# Patient Record
Sex: FEMALE | Race: WHITE | Employment: UNEMPLOYED | URBAN - NONMETROPOLITAN AREA
[De-identification: names, ages, dates, MRNs, and addresses within clinical notes are randomized per-mention and may not be internally consistent; named-entity substitution may affect disease eponyms.]

---

## 2020-09-14 ENCOUNTER — TELEPHONE (OUTPATIENT)
Dept: FAMILY MEDICINE CLINIC | Age: 30
End: 2020-09-14

## 2020-09-14 NOTE — TELEPHONE ENCOUNTER
Patient called stating that she recently saw her ob-gyn due to pelvic pain and they did an ultrasound and checked her IUD and everything was normal. Over the weekend the patient didn't take her Metformin and stated the pelvic pain was not there but when she restarted it the pain has come back. After talking with her sister she is worried about lactic acid levels or if this could be causing problems if it was abnormal and is wondering if you can order labs for her to complete.  Bfharlan vizcaino

## 2020-09-21 RX ORDER — SERTRALINE HYDROCHLORIDE 100 MG/1
100 TABLET, FILM COATED ORAL DAILY
COMMUNITY

## 2020-09-21 RX ORDER — OMEPRAZOLE 40 MG/1
40 CAPSULE, DELAYED RELEASE ORAL DAILY
COMMUNITY

## 2020-09-21 RX ORDER — ALBUTEROL SULFATE 90 UG/1
2 AEROSOL, METERED RESPIRATORY (INHALATION)
COMMUNITY

## 2020-09-21 RX ORDER — METFORMIN HYDROCHLORIDE 500 MG/1
500 TABLET ORAL 2 TIMES DAILY WITH MEALS
COMMUNITY
End: 2020-09-22 | Stop reason: CLARIF

## 2020-09-21 RX ORDER — ADALIMUMAB 40MG/0.8ML
40 KIT SUBCUTANEOUS
COMMUNITY

## 2020-09-22 ENCOUNTER — VIRTUAL VISIT (OUTPATIENT)
Dept: FAMILY MEDICINE CLINIC | Age: 30
End: 2020-09-22
Payer: MEDICAID

## 2020-09-22 DIAGNOSIS — Z00.00 ENCOUNTER FOR PREVENTATIVE ADULT HEALTH CARE EXAMINATION: ICD-10-CM

## 2020-09-22 DIAGNOSIS — E28.2 PCOS (POLYCYSTIC OVARIAN SYNDROME): Primary | ICD-10-CM

## 2020-09-22 DIAGNOSIS — E53.8 B12 DEFICIENCY: ICD-10-CM

## 2020-09-22 DIAGNOSIS — E55.9 VITAMIN D DEFICIENCY: ICD-10-CM

## 2020-09-22 DIAGNOSIS — E28.2 PCOS (POLYCYSTIC OVARIAN SYNDROME): ICD-10-CM

## 2020-09-22 DIAGNOSIS — R10.2 PELVIC PAIN: ICD-10-CM

## 2020-09-22 PROCEDURE — 99214 OFFICE O/P EST MOD 30 MIN: CPT | Performed by: NURSE PRACTITIONER

## 2020-09-22 RX ORDER — METFORMIN HYDROCHLORIDE 500 MG/1
TABLET, EXTENDED RELEASE ORAL
Qty: 60 TAB | Refills: 1 | Status: SHIPPED | OUTPATIENT
Start: 2020-09-22

## 2020-09-22 RX ORDER — MINERAL OIL
180 ENEMA (ML) RECTAL DAILY
COMMUNITY

## 2020-09-22 NOTE — PROGRESS NOTES
Lul Cardenas presents today for   Chief Complaint   Patient presents with    Abdominal Cramping     Patient has been having a lot of abdominal cramping for about a month. She said she had an ultrasound done 2 weeks ago to rule out the IUD causing the pain. Depression Screening:  3 most recent PHQ Screens 9/22/2020   Little interest or pleasure in doing things Not at all   Feeling down, depressed, irritable, or hopeless Not at all   Total Score PHQ 2 0       Learning Assessment:  Learning Assessment 9/22/2020   PRIMARY LEARNER Patient   HIGHEST LEVEL OF EDUCATION - PRIMARY LEARNER  2 YEARS OF COLLEGE   BARRIERS PRIMARY LEARNER NONE   CO-LEARNER CAREGIVER No   PRIMARY LANGUAGE ENGLISH   LEARNER PREFERENCE PRIMARY DEMONSTRATION   ANSWERED BY patient   RELATIONSHIP SELF       Health Maintenance reviewed and discussed and ordered per Provider. Health Maintenance Due   Topic Date Due    Pneumococcal 0-64 years (1 of 1 - PPSV23) 05/12/1998    DTaP/Tdap/Td series (1 - Tdap) 05/12/2013    PAP AKA CERVICAL CYTOLOGY  05/12/2013    Flu Vaccine (1) 09/01/2020   . Coordination of Care:  1. Have you been to the ER, urgent care clinic since your last visit? Hospitalized since your last visit? ER for sinus infection about a month ago. 2. Have you seen or consulted any other health care providers outside of the [address] [place] [address] since your last visit? Include any pap smears or colon screening.  No

## 2020-09-22 NOTE — PATIENT INSTRUCTIONS
Polycystic Ovary Syndrome: Care Instructions Your Care Instructions Polycystic ovary syndrome, or PCOS, means a woman's hormones are out of balance. It can cause problems with your periods and make it hard to get pregnant. Doctors don't know for sure what causes PCOS, but it seems to run in families. It also seems to be linked to obesity and a risk for diabetes. If you have PCOS, your sisters and daughters have a higher chance of getting it too. You may have other symptoms. These include weight gain, acne, too much hair growth on the face or body, high blood pressure, and high blood sugar. Your ovaries may have cysts on them. These cysts are growths filled with fluid. Keep in mind that although you may not have regular periods, you can still get pregnant. Talk to your doctor about birth control if you do not want to get pregnant. Sometimes the hormone changes with PCOS can also make it hard for some women to get pregnant. If this is a concern, talk to your doctor about treatment for this problem. Women who have PCOS can go for months or longer with no period. Your doctor may recommend medicines that can help get your cycles back to normal. 
Follow-up care is a key part of your treatment and safety. Be sure to make and go to all appointments, and call your doctor if you are having problems. It's also a good idea to know your test results and keep a list of the medicines you take. How can you care for yourself at home? · Take your medicines exactly as prescribed. Call your doctor if you think you are having a problem with your medicine. · Eat a healthy diet. Include fruits, vegetables, beans, and whole grains in your diet each day. · If you are overweight, losing weight can help with many of the symptoms of PCOS. Talk to your doctor about safe ways to lose weight. · Get at least 30 minutes of exercise on most days of the week.  Walking is a good choice. Or you can run, swim, cycle, or play tennis or team sports. · For hair growth you don't want, try bleaching, plucking, electrolysis, or laser therapy. · Acne can be treated with over-the-counter medicines. Look for ones that have benzoyl peroxide or salicylic acid in them. When should you call for help? Call your doctor now or seek immediate medical care if: 
  · You have severe vaginal bleeding.  
  · You have new or worse belly or pelvic pain. Watch closely for changes in your health, and be sure to contact your doctor if: 
  · You do not get better as expected.  
  · You have unusual vaginal bleeding. Where can you learn more? Go to http://huseyin-kolton.info/ Enter A899 in the search box to learn more about \"Polycystic Ovary Syndrome: Care Instructions. \" Current as of: November 8, 2019               Content Version: 12.6 © 3960-7422 [employer]. Care instructions adapted under license by [employer] (which disclaims liability or warranty for this information). If you have questions about a medical condition or this instruction, always ask your healthcare professional. [address] any warranty or liability for your use of this information.

## 2020-09-22 NOTE — PROGRESS NOTES
Loly Tee is a 31 y. o.female who presents today via virtual video visit for   Chief Complaint   Patient presents with    Abdominal Cramping     Patient has been having a lot of abdominal cramping for about a month. She said she had an ultrasound done 2 weeks ago to rule out the IUD causing the pain. 73-year-old female presents today via virtual video visit for routine follow-up. Patient has medical history significant for PCOS currently on metformin. Patient states that she is developed pelvic pain she attributes that to the metformin. She states this developed roughly 4 months ago however she has been on the metformin for roughly 3 years. She states she saw OB/GYN they reassess her IUD and noted no abnormalities there. Patient denies any urinary signs or symptoms denies fever or flank pain. Does have history of Crohn's disease on Humira infusions. Denies any constipation diarrhea or abdominal pain. She rates current pelvic pain 4/10. Patient denies any vaginal discharge or pruritus.     Subjective:           Past Medical History:   Diagnosis Date    Arthritis     Depression     GERD (gastroesophageal reflux disease)     Headache     History of chicken pox      Past Surgical History:   Procedure Laterality Date    HX ADENOIDECTOMY      HX HEENT      tubes in ears     HX TONSILLECTOMY      HX WISDOM TEETH EXTRACTION       Social History     Socioeconomic History    Marital status: SINGLE     Spouse name: Not on file    Number of children: Not on file    Years of education: Not on file    Highest education level: Not on file   Tobacco Use    Smoking status: Current Every Day Smoker     Packs/day: 0.25     Years: 8.00     Pack years: 2.00    Smokeless tobacco: Never Used   Substance and Sexual Activity    Alcohol use: Yes     Comment: occasional    Drug use: Not Currently    Sexual activity: Yes     Current Outpatient Medications   Medication Sig Dispense Refill    fexofenadine (ALLEGRA) 180 mg tablet Take 180 mg by mouth daily.  metFORMIN ER (GLUCOPHAGE XR) 500 mg tablet 1 tab po bid 60 Tab 1    adalimumab (Humira) 40 mg/0.8 mL injection 40 mg by SubCUTAneous route every seven (7) days.  omeprazole (PRILOSEC) 40 mg capsule Take 40 mg by mouth daily.  albuterol (ProAir HFA) 90 mcg/actuation inhaler Take 2 Puffs by inhalation every four (4) hours as needed for Wheezing.  sertraline (ZOLOFT) 100 mg tablet Take 100 mg by mouth daily. Allergies   Allergen Reactions    Latex Unknown (comments)    Adhesive Unknown (comments)     The patient has a family history of    REVIEW OF SYSTEMS  Review of Systems   Constitutional: Negative for chills and fever. HENT: Negative for ear discharge, ear pain, hearing loss, sinus pain and sore throat. Eyes: Negative for pain. Respiratory: Negative for cough and shortness of breath. Cardiovascular: Negative for chest pain, palpitations and leg swelling. Gastrointestinal: Negative for nausea and vomiting. Genitourinary: Negative for dysuria, frequency and urgency. Musculoskeletal: Negative for falls, myalgias and neck pain. Skin: Negative for rash. Neurological: Negative for dizziness, tingling, tremors and headaches. Psychiatric/Behavioral: Negative for depression, substance abuse and suicidal ideas. The patient is not nervous/anxious and does not have insomnia. Objective: There were no vitals taken for this visit. Current Outpatient Medications   Medication Instructions    albuterol (ProAir HFA) 90 mcg/actuation inhaler 2 Puffs, Inhalation, EVERY 4 HOURS AS NEEDED    fexofenadine (ALLEGRA) 180 mg, Oral, DAILY    Humira 40 mg, SubCUTAneous, EVERY 7 DAYS    metFORMIN ER (GLUCOPHAGE XR) 500 mg tablet 1 tab po bid    omeprazole (PRILOSEC) 40 mg, Oral, DAILY    sertraline (ZOLOFT) 100 mg, Oral, DAILY        PHYSICAL EXAM  Physical Exam  Constitutional:       Appearance: She is obese.    HENT: Head: Normocephalic and atraumatic. Eyes:      General: No scleral icterus. Right eye: No discharge. Left eye: No discharge. Pulmonary:      Effort: Pulmonary effort is normal. No respiratory distress. Musculoskeletal:         General: No swelling. Right lower leg: No edema. Left lower leg: No edema. Skin:     Coloration: Skin is not pale. Findings: No erythema or rash. Neurological:      General: No focal deficit present. Mental Status: She is alert and oriented to person, place, and time. Psychiatric:         Mood and Affect: Mood normal.         Behavior: Behavior normal.         Thought Content: Thought content normal.         Judgment: Judgment normal.         Assessment/Plan:     Diagnoses and all orders for this visit:    1. PCOS (polycystic ovarian syndrome)  -     metFORMIN ER (GLUCOPHAGE XR) 500 mg tablet; 1 tab po bid  -     HEMOGLOBIN A1C WITH EAG; Future  -     METABOLIC PANEL, COMPREHENSIVE; Future  -Labs today any changes to current treatment contingent upon those findings    2. Encounter for preventative adult health care examination  -     CBC W/O DIFF; Future  -     LIPID PANEL; Future  -     METABOLIC PANEL, COMPREHENSIVE; Future  -     TSH 3RD GENERATION; Future  -Labs today any changes to current treatment contingent upon those findings    3. Vitamin D deficiency  VITAMIN D, 25 HYDROXY; Future  -Labs today any changes to current treatment contingent upon those findings      4. B12 deficiency  -     VITAMIN B12; Future  -Labs today any changes to current treatment contingent upon those findings    5. Pelvic pain  Highly unlikely that the metformin is causing pelvic pain specifically however I am changing her over to the extended release version she will follow-up in 1 month or sooner as needed.       Follow-up and Dispositions    · Return in about 1 month (around 10/22/2020) for reassessment, lenka Samuel, who was evaluated through a synchronous (real-time) audio-video encounter, and/or her healthcare decision maker, is aware that it is a billable service, with coverage as determined by her insurance carrier. She provided verbal consent to proceed: Yes, and patient identification was verified. It was conducted pursuant to the emergency declaration under the [facility], [address] [place] [address] authority and the [employer] General Act. A caregiver was present when appropriate. Ability to conduct physical exam was limited. I was in the office. The patient was at home. Disclaimer:    I have discussed the diagnosis with the patient and the intended plan as seen above. The patient understands our medical plan. The risks, benefits and significant side effects of all medications have been reviewed. Anticipated time course and progression of condition reviewed. All questions have been addressed. She received an after visit summary, with information reviewed, and questions answered. Where appropriate, she is instructed to call the clinic if she has not been notified either by phone or through [address] with the results of her tests or with an appointment plan for any referrals within 1 week(s). The patient  is to call if her condition worsens or fails to improve or if significant side effects are experienced.        Iglesia Mahan NP

## 2020-10-22 ENCOUNTER — VIRTUAL VISIT (OUTPATIENT)
Dept: FAMILY MEDICINE CLINIC | Age: 30
End: 2020-10-22
Payer: MEDICAID

## 2020-10-22 DIAGNOSIS — E28.2 PCOS (POLYCYSTIC OVARIAN SYNDROME): Primary | ICD-10-CM

## 2020-10-22 PROCEDURE — 99213 OFFICE O/P EST LOW 20 MIN: CPT | Performed by: NURSE PRACTITIONER

## 2020-10-22 NOTE — PROGRESS NOTES
Honey Prieto presents today for   Chief Complaint   Patient presents with    Follow-up    Diabetes         Depression Screening:  3 most recent PHQ Screens 10/22/2020   Little interest or pleasure in doing things Not at all   Feeling down, depressed, irritable, or hopeless Not at all   Total Score PHQ 2 0       Learning Assessment:  Learning Assessment 9/22/2020   PRIMARY LEARNER Patient   HIGHEST LEVEL OF EDUCATION - PRIMARY LEARNER  2 YEARS OF COLLEGE   BARRIERS PRIMARY LEARNER NONE   CO-LEARNER CAREGIVER No   PRIMARY LANGUAGE ENGLISH   LEARNER PREFERENCE PRIMARY DEMONSTRATION   ANSWERED BY patient   RELATIONSHIP SELF         Health Maintenance reviewed and discussed and ordered per Provider. Health Maintenance Due   Topic Date Due    Pneumococcal 0-64 years (1 of 1 - PPSV23) 05/12/1998    PAP AKA CERVICAL CYTOLOGY  05/12/2013    Flu Vaccine (1) 09/01/2020   . Coordination of Care:  1. Have you been to the ER, urgent care clinic since your last visit? Hospitalized since your last visit? No    2. Have you seen or consulted any other health care providers outside of the [address] [place] [address] since your last visit? Include any pap smears or colon screening.  No

## 2020-10-25 NOTE — PROGRESS NOTES
Melissa Avila is a 31 y. o.female who presents today via virtual video visit for   Chief Complaint   Patient presents with    Follow-up    Diabetes       70-year-old female presents today via virtual video visit for follow-up related to intolerance to Metformin which she is taking for PCOS. Patient states that she is tolerating the medication well she verbalizes no complaints of today. Subjective:           Past Medical History:   Diagnosis Date    Arthritis     Depression     GERD (gastroesophageal reflux disease)     Headache     History of chicken pox      Past Surgical History:   Procedure Laterality Date    HX ADENOIDECTOMY      HX HEENT      tubes in ears     HX TONSILLECTOMY      HX WISDOM TEETH EXTRACTION       Social History     Socioeconomic History    Marital status: SINGLE     Spouse name: Not on file    Number of children: Not on file    Years of education: Not on file    Highest education level: Not on file   Tobacco Use    Smoking status: Current Every Day Smoker     Packs/day: 0.25     Years: 8.00     Pack years: 2.00    Smokeless tobacco: Never Used   Substance and Sexual Activity    Alcohol use: Yes     Comment: occasional    Drug use: Not Currently    Sexual activity: Yes     Current Outpatient Medications   Medication Sig Dispense Refill    fexofenadine (ALLEGRA) 180 mg tablet Take 180 mg by mouth daily.  metFORMIN ER (GLUCOPHAGE XR) 500 mg tablet 1 tab po bid (Patient taking differently: Take 500 mg by mouth daily (with dinner). ) 60 Tab 1    adalimumab (Humira) 40 mg/0.8 mL injection 40 mg by SubCUTAneous route every seven (7) days.  omeprazole (PRILOSEC) 40 mg capsule Take 40 mg by mouth daily.  albuterol (ProAir HFA) 90 mcg/actuation inhaler Take 2 Puffs by inhalation every four (4) hours as needed for Wheezing.  sertraline (ZOLOFT) 100 mg tablet Take 100 mg by mouth daily.        Allergies   Allergen Reactions    Latex Unknown (comments)    Adhesive Unknown (comments)     The patient has a family history of    REVIEW OF SYSTEMS  Review of Systems   Respiratory: Negative for cough. Cardiovascular: Negative for chest pain. Gastrointestinal: Negative for abdominal pain, constipation, diarrhea, heartburn, nausea and vomiting. Neurological: Negative for headaches. Objective: There were no vitals taken for this visit. Current Outpatient Medications   Medication Instructions    albuterol (ProAir HFA) 90 mcg/actuation inhaler 2 Puffs, Inhalation, EVERY 4 HOURS AS NEEDED    fexofenadine (ALLEGRA) 180 mg, Oral, DAILY    Humira 40 mg, SubCUTAneous, EVERY 7 DAYS    metFORMIN ER (GLUCOPHAGE XR) 500 mg tablet 1 tab po bid    omeprazole (PRILOSEC) 40 mg, Oral, DAILY    sertraline (ZOLOFT) 100 mg, Oral, DAILY        PHYSICAL EXAM  Physical Exam  Constitutional:       Appearance: She is obese. Neurological:      Mental Status: She is alert and oriented to person, place, and time. Psychiatric:         Mood and Affect: Mood normal.         Behavior: Behavior normal.         Assessment/Plan:     Diagnoses and all orders for this visit:    1. PCOS (polycystic ovarian syndrome)  Patient will continue Metformin as discussed follow-up as needed      Follow-up and Dispositions    · Return in about 6 months (around 4/22/2021) for in office, routine, reassessment. Dennis Paul, who was evaluated through a synchronous (real-time) audio-video encounter, and/or her healthcare decision maker, is aware that it is a billable service, with coverage as determined by her insurance carrier. She provided verbal consent to proceed: Yes, and patient identification was verified. It was conducted pursuant to the emergency declaration under the [address] [place] [address], [address] [place] [address] authority and the [employer] General Act. A caregiver was present when appropriate.  Ability to conduct physical exam was limited. I was in the office. The patient was at home. Disclaimer:    I have discussed the diagnosis with the patient and the intended plan as seen above. The patient understands our medical plan. The risks, benefits and significant side effects of all medications have been reviewed. Anticipated time course and progression of condition reviewed. All questions have been addressed. She received an after visit summary, with information reviewed, and questions answered. Where appropriate, she is instructed to call the clinic if she has not been notified either by phone or through [phone] with the results of her tests or with an appointment plan for any referrals within 1 week(s). The patient  is to call if her condition worsens or fails to improve or if significant side effects are experienced.        Jessy Dasilva, NP

## 2020-11-18 ENCOUNTER — TELEPHONE (OUTPATIENT)
Dept: FAMILY MEDICINE CLINIC | Age: 30
End: 2020-11-18

## 2020-11-18 NOTE — TELEPHONE ENCOUNTER
Patient called to see if we had any available appointments this week or next week. I told her [employer] is off the rest of this week and only works Mon and Tues next week due to the Thanksgiving holiday, and her schedule next week is already full. Patient did not say what was wrong, she just said \"Okay, I'll just go to urgent care. \"

## 2021-03-02 ENCOUNTER — TRANSCRIBE ORDER (OUTPATIENT)
Dept: SCHEDULING | Age: 31
End: 2021-03-02

## 2021-03-02 DIAGNOSIS — R55 SYNCOPE: Primary | ICD-10-CM

## 2021-03-04 ENCOUNTER — TRANSCRIBE ORDER (OUTPATIENT)
Dept: SCHEDULING | Age: 31
End: 2021-03-04

## 2021-03-04 DIAGNOSIS — R55 SYNCOPE: Primary | ICD-10-CM

## 2021-08-17 ENCOUNTER — HOSPITAL ENCOUNTER (OUTPATIENT)
Dept: PHYSICAL THERAPY | Age: 31
Discharge: HOME | End: 2021-08-17
Payer: MEDICAID

## 2021-08-17 PROCEDURE — 97162 PT EVAL MOD COMPLEX 30 MIN: CPT

## 2021-08-17 NOTE — PROGRESS NOTES
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   ==========================================================================================  Assessment / Functional Analysis:    Pt is a 36y.o. year old  female who presents to outpatient clinic today s/p posterior fossa craniectomy, C1 laminectomy for Chiari malformation decompression 2021. Pt is RHD and presents today with impairments that include decreased cervical AROM, decreased shoulder flexion and abduction AROM, B shoulder flexion, abduction, ER weakness, B elbow and forearm weakness, forward head posture, rounded shoulders and poor postural endurance & awareness.  Pt will benefit from skilled PT intervention to target deficits in effort to maximize pain-free daily activity, restore PLOF within home, classroom and community.   ==========================================================================================  Eval Complexity: History: MEDIUM  Complexity : 1-2 comorbidities / personal factors will impact the outcome/ POC Exam:MEDIUM Complexity : 3 Standardized tests and measures addressing body structure, function, activity limitation and / or participation in recreation  Presentation: LOW Complexity : Stable, uncomplicated  Clinical Decision Making:MEDIUM Complexity : FOTO score of 26-74Overall Complexity:MEDIUM    Problem List: pain affecting function, decrease ROM, decrease strength, decrease ADL/ functional abilitiies, decrease activity tolerance and decrease flexibility/ joint mobility   Treatment Plan may include any combination of the following: Therapeutic exercise, Physical agent/modality, Manual therapy, Patient education, Self Care training, Functional mobility training and Other: Dry Needling  Patient / Family readiness to learn indicated by: trying to perform skills and interest  Persons(s) to be included in education: patient (P)  Barriers to Learning/Limitations: None      Patient self reported health status: fair  Rehabilitation Potential: good    Objective Measures:  Palpation: incision healing appropriatly, palpatory tenderness most noted along CTJ, cervical parapsinals      Posture: []? WNL  Head Position: forward  Shoulder/Scapular Position: rounded  C-Kyphosis:     []? increased   []? decreased   C-Lordosis:      [x]? increased   []? decreased  T-Kyphosis:     [x]? increased   []? decreased  T-Lordosis:      []? increased   []? decreased       Shoulder/Scapular Screen: []? WNL    []? Abnormal:     Active Movements: []? N/A   []? Too acute   []? Other:  ROM AROM Comments:pain, area   Forward flexion 27     Extension 25     SB right 20  pain reported   SB left  25  pain reported   Rotation right 32     Rotation left 44        Thoracic Spine: []? N/A    [x]? WNL   []? Other:     Neuro Screen (myotome/dematome/felexes): [x]?  WNL                                                                 AROM                     PROM                       MMT      Left Right Left Right Left Right   Shoulder Flexion 122 130     3+/5 3+/5     Abduction 135 115     4+/5 4+/5     Extension [facility]     5/5 5/5     IR T9 level L1 level     5/5 5/5     ER [facility]     4+/5 4+/5     Horizontal Add [facility] WFL             Horizontal Abd [facility] WFL           Elbow Flexion [facility] WFL     4+/5 4+/5     Extension [facility] WFL     4+/5 4+/5     Prontation [facility]     5/5 4+/5     Supination [facility] WFL     4+/5 4+/5   Wrist  Flexion [facility] WF     5/5 4+/5     Extension [facility]     5/5 4+/5   - pt reports \"tension\" with overhead reach  - right wrist/forearm weakness related to history of wrist sprain     Muscle Flexibility: []? N/A              Upper Trap:     []? WNL    [x]? Tight    []? R    []? L              Levator:           []? WNL    [x]? Tight    []? R    []? L              Pect. Minor:     []? WNL    [x]? Tight    []? R    []? L         Short Term Goals:  1. Pt will be independent with HEP to improve cervical and UE AROM, postural awareness and stabilization in 3 weeks. 2. Pt will improve cervical AROM by atleast 5 degrees in all planes for improved ability to scan environment while driving in 3 weeks. 3. Pt will report no greater than 6/10 pain in cervical region with all daily activity in 3 weeks. Long Term Goals:  1. Pt will improve B shoulder flexion & abduction AROM to >140 degrees for improved overhead reach in 6 weeks. 2. Pt will improve B shoulder strength by 1/2 MMT grade for improved ability to perform leisure activities in 6 weeks. 3. Pt will report no greater than 2/10 pain in cervical region with daily activity in 6 weeks. 4. Pt will improve NDI score to <20% for improved functional mobility & quality of life in 6 weeks. Frequency / Duration: Patient to be seen  3  times per week for 6  weeks:  Patient / Caregiver education and instruction: self care, activity modification and exercises    Therapist Signature: Husam Irene PT. DPT Date: 3/77/0775   Certification Period: 08/17/2021 - 10/31/2021 Time: 10:29 AM   ===========================================================================================  I certify that the above Physical Therapy Services are being furnished while the patient is under my care. I agree with the treatment plan and certify that this therapy is necessary.     Physician Signature:        Date:       Time:     Please sign and return to [facility] or you may fax the signed copy to [phone]. Please call [phone] if more information required. Thank you.
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
## 2021-08-17 NOTE — PROGRESS NOTES
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 Dry needling is a treatment technique utilized in conjunction with other PT interventions to inactivate myofascial trigger points and the pain and dysfunction they cause. Dry Needling is an advanced procedure that requires additional training of intensive course work. PROCEDURE:  - Solid filament sterile needle (typically 0.3mm/30 gauge) inserted into a trigger point  - Repeated movements inactivate the trigger points, taking 30-60 seconds per site  Typically consists of 1 dry needling session per week and a possible second treatment including muscle re-education, flexibility, strengthening and other manual techniques to facilitate the benefits of dry needling  BENEFITS:   Inactivation of trigger points    Decreased pain    Increased muscle length    Improved movement patterns    Restoration of function POTENTIAL RISKS:   Post-needling soreness    Infection    Bruising/bleeding    Penetration of a nerve    Pneumothorax  All treating PTs have been thoroughly educated in avoiding adverse reactions    If you agree with this recommendation, please sign the attached prescription form and fax it to us at [phone]. If you have questions or concerns regarding dry needling or any other treatment we may be providing, please contact us at [phone].  Thank you for allowing us to assist in the care of your patient.   Therapist Signature: King Mabel PT, DPT Date: 8/17/2021      Time: 1:02 PM        NOTE TO PHYSICIAN: PLEASE COMPLETE THE ORDERS BELOW AND FAX TO [facility]: [phone]  If you are unable to process this request in 24 hours please contact our office: [phone]  Check below:        ______ I have read the above request and AGREE to the recommendation of including dry needling as part of the plan of care.      ______ I have read the above request and DO NOT AGREE to including dry needling as part of the plan of care.      ______ I have read the above report and request that my patient continue therapy with the following changes/special instructions: _________________________________________________    Physician Signature: ________________ Date: __________Time: ___________

## 2021-09-10 ENCOUNTER — HOSPITAL ENCOUNTER (OUTPATIENT)
Dept: PHYSICAL THERAPY | Age: 31
Discharge: HOME | End: 2021-09-10
Payer: MEDICAID

## 2021-09-10 PROCEDURE — 97140 MANUAL THERAPY 1/> REGIONS: CPT

## 2021-09-10 PROCEDURE — 97110 THERAPEUTIC EXERCISES: CPT

## 2021-09-10 NOTE — PROGRESS NOTES
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 No c/o of dizziness or lightheadedness. Pain Level (0-10 scale): 4    Any medication changes, allergies to medications, adverse drug reactions, diagnosis change, or new procedure performed?: [x] No    [] Yes (see summary sheet for update)        OBJECTIVE    32 min Therapeutic Exercise:  [x] See flow sheet :   Rationale: increase ROM, increase strength and increase proprioception to improve the patients ability to return to prior level of function before injury/illness with reduced pain, achieving optimal strength and function to perform household tasks, daily activities, and return to community events, and/or work. 16 min Manual Therapy:  Improve cervical ROM   Rationale: increase ROM, increase tissue extensibility and decrease trigger points to reduce muscle tension, improve ROM, and effort to achieve patient's full potential to return to normal activity. With TE  TA   NR  [facility] Patient Education: [x] Review HEP    [] Progressed/Changed HEP based on:   [] positioning   [] body mechanics   [] transfers   [] heat/ice application          Patient's response to today's treatment: Began session today with stretches. Pulleys, seated thoracic extension, followed by manual therapy to neck with greater focus on the R side for scalenes and traps. Plan to progress as able. Pain Level (0-10 scale) post treatment: 0    ASSESSMENT/Changes in Function: Continued with POC with exercises as noted per flow sheet.  Pt able to tolerate today's session with minimal increase in pain, which resolved post exercise. Will cont to progress as able. Patient will continue to benefit from skilled PT services to address functional mobility deficits, address ROM deficits, address strength deficits and analyze and cue movement patterns to attain remaining goals.      [x]  See Plan of Care  []  See progress note/recertification  []  See Discharge Summary           PLAN  []  Upgrade activities as tolerated     [x]  Continue plan of care  []  Update interventions per flow sheet       []  Discharge due to:_  []  Other:_      VAUGHN Figueredo 9/10/2021  11:25 AM

## 2021-09-10 NOTE — PROGRESS NOTES
PT DAILY TREATMENT NOTE 8-    Patient Name: Mulugeta Villarreal  Date:9/10/2021  : 1990  [x]  Patient  Verified  Payor: Raphael Arias / Plan: [address] [place] [address] / Product Type: Managed Care Medicaid /    VAUGHN Hassan 9/10/2021  11:22 AM

## 2021-09-13 ENCOUNTER — HOSPITAL ENCOUNTER (OUTPATIENT)
Dept: PHYSICAL THERAPY | Age: 31
Discharge: HOME | End: 2021-09-13
Payer: MEDICAID

## 2021-09-13 PROCEDURE — 97110 THERAPEUTIC EXERCISES: CPT

## 2021-09-13 PROCEDURE — 97140 MANUAL THERAPY 1/> REGIONS: CPT

## 2021-09-13 NOTE — PROGRESS NOTES
PT DAILY TREATMENT NOTE     Patient Name: Anish Morrow  Date:2021  : 1990  [x]  Patient  Verified  Payor: BLUE CROSS MEDICAID / Plan: [facility] HEALTHKEEPERS PLUS / Product Type: Managed Care Medicaid /    In time:1400  Out time:1440  Total Treatment Time (min): 40  Total Timed Codes (min): 40  1:1 Treatment Time (min): 40   Visit #: 3 of 18    Treatment Area: Compression of brain [G93.5]    SUBJECTIVE  Pt states compliance with HEP. Pt reports soreness and minor pain in cervical region. Pain Level (0-10 scale): 2    Any medication changes, allergies to medications, adverse drug reactions, diagnosis change, or new procedure performed?: [x] No    [] Yes (see summary sheet for update)    OBJECTIVE  Modality rationale: Declined. Min Type Additional Details    [] Estim: []Att   []Unatt  []TENS instruct                 []IFC  []Premod []NMES                       []Other:  []w/US   []w/ice   []w/heat  Position:  Location:    []  Traction: [] Cervical       []Lumbar                       [] Prone          []Supine                       []Intermittent   []Continuous Lbs:  [] before manual  [] after manual    []  Ultrasound: []Continuous   [] Pulsed                           []1MHz   []3MHz Location:  W/cm2:    []  Ice     []  heat  []  Ice massage Position:  Location:    []  Vasopneumatic Device Pressure: [] lo [] med [] hi   Temp: [] lo [] med [] hi   [] Skin assessment post-treatment:  []intact []redness- no adverse reaction       []redness  adverse reaction:     30 min Therapeutic Exercise:  [x] See flow sheet :   Rationale: increase ROM and increase strength to improve the patients ability to return to PLOF having full AROM and strength.     10 min Manual Therapy:  STM to B UT/ paraspinals    Rationale: decrease pain, increase ROM, increase tissue extensibility and decrease trigger points           With TE  TA   NR  GT   Misc Patient Education: [x] Review HEP    [] Progressed/Changed HEP based on:   [] positioning   [] body mechanics   [] transfers   [] heat/ice application        Pain Level (0-10 scale) post treatment: 2    ASSESSMENT/Changes in Function: Continued POC working to improve posture, cervical AROM, and B shoulder strength. Session began with  cervical stretches and thoracic mobility. Pulleys followed working to improve B shoulder range of motion. Pt was reminded of posture throughout. Introduced cervical isometrics to begin gentle strengthening. Scapular strengthen and postural education followed reminding patient to be mindful went sitting and standing. STM followed to B UT/ paraspinals to pateint's tolerance. Pt showed improved range of motion and mobility post session. Will continue POC and progress as able. Patient will continue to benefit from skilled PT services to modify and progress therapeutic interventions, address functional mobility deficits, address ROM deficits, address strength deficits, analyze and address soft tissue restrictions, analyze and cue movement patterns, analyze and modify body mechanics/ergonomics and assess and modify postural abnormalities to attain remaining goals.      [x]  See Plan of Care  []  See progress note/recertification  []  See Discharge Summary         PLAN  []  Upgrade activities as tolerated     [x]  Continue plan of care  []  Update interventions per flow sheet       []  Discharge due to:_  []  Other:_      VAUGHN Blair   9/13/2021  2:53 PM

## 2021-09-15 ENCOUNTER — HOSPITAL ENCOUNTER (OUTPATIENT)
Dept: PHYSICAL THERAPY | Age: 31
Discharge: HOME | End: 2021-09-15
Payer: MEDICAID

## 2021-09-15 PROCEDURE — 97140 MANUAL THERAPY 1/> REGIONS: CPT

## 2021-09-15 PROCEDURE — 97110 THERAPEUTIC EXERCISES: CPT

## 2021-09-15 NOTE — PROGRESS NOTES
PT DAILY TREATMENT NOTE     Patient Name: Syd Roca  Date:9/15/2021  : 1990  [x]  Patient  Verified  Payor: BLUE CROSS MEDICAID / Plan: [facility] HEALTHKEEPERS PLUS / Product Type: Managed Care Medicaid /    In time:1330  Out time: 1430  Total Treatment Time (min): 60  Total Timed Codes (min): 50  1:1 Treatment Time (min):  50  Visit #: 4 of 18    Treatment Area: Compression of brain [G93.5]    SUBJECTIVE  Pt states that her pain is elevated today but uncertain as to why its elevated. Pain Level (0-10 scale): 7/10    Any medication changes, allergies to medications, adverse drug reactions, diagnosis change, or new procedure performed?: [x] No    [] Yes (see summary sheet for update)    OBJECTIVE  Modality rationale: Declined. Min Type Additional Details    [] Estim: []Att   []Unatt  []TENS instruct                 []IFC  []Premod []NMES                       []Other:  []w/US   []w/ice   []w/heat  Position:  Location:    []  Traction: [] Cervical       []Lumbar                       [] Prone          []Supine                       []Intermittent   []Continuous Lbs:  [] before manual  [] after manual    []  Ultrasound: []Continuous   [] Pulsed                           []1MHz   []3MHz Location:  W/cm2:   10 []  Ice     [x]  heat  []  Ice massage Position: Seated   Location: Cervical with pillow in her lap    []  Vasopneumatic Device Pressure: [] lo [] med [] hi   Temp: [] lo [] med [] hi   [] Skin assessment post-treatment:  []intact []redness- no adverse reaction       []redness  adverse reaction:     40 min Therapeutic Exercise:  [x] See flow sheet :   Rationale: increase ROM and increase strength to improve the patients ability to return to PLOF having full AROM and strength.     10 min Manual Therapy:  STM to B UT/ paraspinals    Rationale: decrease pain, increase ROM, increase tissue extensibility and decrease trigger points           With TE  TA   NR  GT   Misc Patient Education: [x] Review HEP    [] Progressed/Changed HEP based on:   [] positioning   [] body mechanics   [] transfers   [] heat/ice application        Pain Level (0-10 scale) post treatment: 3/10    ASSESSMENT/Changes in Function: Session began with MHP to cervical region. STM followed to B UT/ paraspinals to pateint's tolerance. Continued POC working to improve posture, cervical AROM, and B shoulder strength. Session began with  cervical stretches and thoracic mobility. Introduced B shoulder supine wand flexion with no adverse reaction. Pulleys followed working to improve B shoulder range of motion. Pt was reminded of posture throughout. Continued cervical isometrics to begin gentle strengthening. Reviewed scapular strengthen and postural education reminding patient to be mindful went sitting and standing and ergonomically set up of work desk. Pt showed improved range of motion and mobility post session. Will continue POC and progress as able. Patient will continue to benefit from skilled PT services to modify and progress therapeutic interventions, address functional mobility deficits, address ROM deficits, address strength deficits, analyze and address soft tissue restrictions, analyze and cue movement patterns, analyze and modify body mechanics/ergonomics and assess and modify postural abnormalities to attain remaining goals.      [x]  See Plan of Care  []  See progress note/recertification  []  See Discharge Summary         PLAN  []  Upgrade activities as tolerated     [x]  Continue plan of care  []  Update interventions per flow sheet       []  Discharge due to:_  []  Other:_      VAUGHN Arguelles   9/15/2021  2:33 PM

## 2021-09-20 ENCOUNTER — HOSPITAL ENCOUNTER (OUTPATIENT)
Dept: PHYSICAL THERAPY | Age: 31
Discharge: HOME | End: 2021-09-20
Payer: MEDICAID

## 2021-09-20 PROCEDURE — 97140 MANUAL THERAPY 1/> REGIONS: CPT

## 2021-09-20 PROCEDURE — 97110 THERAPEUTIC EXERCISES: CPT

## 2021-09-20 NOTE — PROGRESS NOTES
PT DAILY TREATMENT NOTE 8    Patient Name: Idalmis Barr  Date:2021  : 1990  [x]  Patient  Verified  Payor: BLUE CROSS MEDICAID / Plan: [facility] HEALTHKEEPERS PLUS / Product Type: Managed Care Medicaid /    In time:1300  Out time:1356  Total Treatment Time (min): 56  Total Timed Codes (min): 46  1:1 Treatment Time (min): 46   Visit #: 5 of 18    Treatment Area: Compression of brain [G93.5]    SUBJECTIVE  Pt states she feels much better compared to previous therapy session. Pt continues to report cervical pain L>R, with stiffness and soreness. Pain Level (0-10 scale): 5    Any medication changes, allergies to medications, adverse drug reactions, diagnosis change, or new procedure performed?: [x] No    [] Yes (see summary sheet for update)    OBJECTIVE  Modality rationale: decrease pain and increase tissue extensibility to improve the patients ability to optimally heal and participate in physical therapy.    Min Type Additional Details    [] Estim: []Att   []Unatt  []TENS instruct                 []IFC  []Premod []NMES                       []Other:  []w/US   []w/ice   []w/heat  Position:  Location:    []  Traction: [] Cervical       []Lumbar                       [] Prone          []Supine                       []Intermittent   []Continuous Lbs:  [] before manual  [] after manual    []  Ultrasound: []Continuous   [] Pulsed                           []1MHz   []3MHz Location:  W/cm2:   10 []  Ice     [x]  heat  []  Ice massage Position: seated  Location: cervical region    []  Vasopneumatic Device Pressure: [] lo [] med [] hi   Temp: [] lo [] med [] hi   [] Skin assessment post-treatment:  []intact []redness- no adverse reaction       []redness  adverse reaction:     30 min Therapeutic Exercise:  [x] See flow sheet :   Rationale: increase ROM, increase strength, improve coordination and increase proprioception to improve the patients ability to restore function and mobility in UE and cervical region allowing for pain free motion. 10 min Manual Therapy:  STM to B UT   Rationale: decrease pain, increase ROM, increase tissue extensibility and decrease trigger points           With TE  TA   NR  GT   Misc Patient Education: [x] Review HEP    [] Progressed/Changed HEP based on:   [] positioning   [] body mechanics   [] transfers   [] heat/ice application        Pain Level (0-10 scale) post treatment: 2    ASSESSMENT/Changes in Function: Session began with MHP per patient's request. Continued with cervical strengthening and AROM exercises to achieve short term goal to improve cervical AROM by atleast 5 degrees in all planes for improved ability to scan environment while driving in 3 weeks. Continued with postural retraining with patient being reminded of sitting and standing posture. Worked to increase B UE shoulder and flexion through supine wand AAROM to patient's tolerance. Patient will continue to benefit from skilled PT services to modify and progress therapeutic interventions, address functional mobility deficits, address ROM deficits, address strength deficits, analyze and address soft tissue restrictions, analyze and cue movement patterns, analyze and modify body mechanics/ergonomics and assess and modify postural abnormalities to attain remaining goals.      [x]  See Plan of Care  []  See progress note/recertification  []  See Discharge Summary         PLAN  []  Upgrade activities as tolerated     [x]  Continue plan of care  []  Update interventions per flow sheet       []  Discharge due to:_  []  Other:_      VAUGHN Mccracken9/20/2021  2:40 PM

## 2021-09-23 ENCOUNTER — HOSPITAL ENCOUNTER (OUTPATIENT)
Dept: PHYSICAL THERAPY | Age: 31
Discharge: HOME | End: 2021-09-23
Payer: MEDICAID

## 2021-09-23 PROCEDURE — 97110 THERAPEUTIC EXERCISES: CPT

## 2021-09-23 NOTE — PROGRESS NOTES
PT DAILY TREATMENT NOTE 8-    Patient Name: Eliezer Gregory  Date:2021  : 1990  [x]  Patient  Verified  Payor: BLUE CROSS MEDICAID / Plan: [facility] HEALTHKEEPERS PLUS / Product Type: Managed Care Medicaid /    In time:1345  Out time:1437  Total Treatment Time (min): 52  Total Timed Codes (min): 52  1:1 Treatment Time (min): 52   Visit #: 6 of 18    Treatment Area: Compression of brain [G93.5]    SUBJECTIVE  Pt enters with no pain stating she took two muscle relaxers last night due to increase pain. Pain Level (0-10 scale): 0    Any medication changes, allergies to medications, adverse drug reactions, diagnosis change, or new procedure performed?: [x] No    [] Yes (see summary sheet for update)    OBJECTIVE  Modality rationale: Declined   Min Type Additional Details    [] Estim: []Att   []Unatt  []TENS instruct                 []IFC  []Premod []NMES                       []Other:  []w/US   []w/ice   []w/heat  Position:  Location:    []  Traction: [] Cervical       []Lumbar                       [] Prone          []Supine                       []Intermittent   []Continuous Lbs:  [] before manual  [] after manual    []  Ultrasound: []Continuous   [] Pulsed                           []1MHz   []3MHz Location:  W/cm2:    []  Ice     []  heat  []  Ice massage Position:  Location:    []  Vasopneumatic Device Pressure: [] lo [] med [] hi   Temp: [] lo [] med [] hi   [] Skin assessment post-treatment:  []intact []redness- no adverse reaction       []redness  adverse reaction:     50 min Therapeutic Exercise:  [x] See flow sheet :   Rationale: increase ROM, increase strength, improve coordination and increase proprioception to restore function and mobility allowing for ease with all activities.          With TE  TA   NR  GT   Misc Patient Education: [x] Review HEP    [] Progressed/Changed HEP based on:   [] positioning   [] body mechanics   [] transfers   [] heat/ice application        Pain Level (0-10 scale) post treatment: 0    ASSESSMENT/Changes in Function: Continued with cervical strengthening and stretches working to decrease cervical pain and improve AROM. AAROM activities via pulleys and supine wand with patient showing improved range of motion. Introduced AROM supine and sidelying to increase overhead strength. Will continue POC working to improve posture and cervical AROM. Patient will continue to benefit from skilled PT services to modify and progress therapeutic interventions, address functional mobility deficits, address ROM deficits, address strength deficits, analyze and address soft tissue restrictions, analyze and cue movement patterns, analyze and modify body mechanics/ergonomics, assess and modify postural abnormalities and address imbalance/dizziness to attain remaining goals.      [x]  See Plan of Care  []  See progress note/recertification  []  See Discharge Summary         PLAN  []  Upgrade activities as tolerated     [x]  Continue plan of care  []  Update interventions per flow sheet       []  Discharge due to:_  []  Other:_      VAUGHN Blair  9/23/2021  2:44 PM

## 2021-09-27 ENCOUNTER — HOSPITAL ENCOUNTER (OUTPATIENT)
Dept: PHYSICAL THERAPY | Age: 31
Discharge: HOME | End: 2021-09-27
Payer: MEDICAID

## 2021-09-27 PROCEDURE — 97110 THERAPEUTIC EXERCISES: CPT

## 2021-09-27 NOTE — PROGRESS NOTES
PT DAILY TREATMENT NOTE 8-    Patient Name: Meghana Ramesh  Date:2021  : 1990  [x]  Patient  Verified  Payor: BLUE CROSS MEDICAID / Plan: [address] [place] [address] / Product Type: Managed Care Medicaid /    In time: 7716  Out time:1529  Total Treatment Time (min): 54  Total Timed Codes (min): 54  1:1 Treatment Time (min): 54  Visit #: 7 of 18    Treatment Area: Compression of brain [G93.5]    SUBJECTIVE  Pt enters stating her arm is sore due to vaccination and she has only had 3 hours of sleep. Relates that her neck is sore, no other complaints. Pain Level (0-10 scale): 0/10    Any medication changes, allergies to medications, adverse drug reactions, diagnosis change, or new procedure performed?: [x] No    [] Yes (see summary sheet for update)    OBJECTIVE  Modality rationale: Declined   Min Type Additional Details    [] Estim: []Att   []Unatt  []TENS instruct                 []IFC  []Premod []NMES                       []Other:  []w/US   []w/ice   []w/heat  Position:  Location:    []  Traction: [] Cervical       []Lumbar                       [] Prone          []Supine                       []Intermittent   []Continuous Lbs:  [] before manual  [] after manual    []  Ultrasound: []Continuous   [] Pulsed                           []1MHz   []3MHz Location:  W/cm2:    []  Ice     []  heat  []  Ice massage Position:  Location:    []  Vasopneumatic Device Pressure: [] lo [] med [] hi   Temp: [] lo [] med [] hi   [] Skin assessment post-treatment:  []intact []redness- no adverse reaction       []redness  adverse reaction:     54 min Therapeutic Exercise:  [x] See flow sheet :   Rationale: increase ROM, increase strength, improve coordination and increase proprioception to restore function and mobility allowing for ease with all activities.          With TE  TA   NR  GT   Misc Patient Education: [x] Review HEP    [] Progressed/Changed HEP based on:   [] positioning   [] body mechanics   [] transfers   [] heat/ice application        Pain Level (0-10 scale) post treatment: 0/10    ASSESSMENT/Changes in Function: Continued with cervical strengthening and stretches working to decrease cervical pain and improve AROM. AAROM activities via pulleys and supine wand with patient showing improved range of motion. Continued AROM supine and sidelying with increased reps x15 B  to increase overhead strength. Will continue POC working to improve posture and cervical AROM. Patient will continue to benefit from skilled PT services to modify and progress therapeutic interventions, address functional mobility deficits, address ROM deficits, address strength deficits, analyze and address soft tissue restrictions, analyze and cue movement patterns, analyze and modify body mechanics/ergonomics, assess and modify postural abnormalities and address imbalance/dizziness to attain remaining goals.      [x]  See Plan of Care  []  See progress note/recertification  []  See Discharge Summary         PLAN  []  Upgrade activities as tolerated     [x]  Continue plan of care  []  Update interventions per flow sheet       []  Discharge due to:_  []  Other:_      VAUGHN Flor  9/27/2021  3:40 PM

## 2021-09-30 ENCOUNTER — HOSPITAL ENCOUNTER (OUTPATIENT)
Dept: PHYSICAL THERAPY | Age: 31
Discharge: HOME | End: 2021-09-30
Payer: MEDICAID

## 2021-09-30 PROCEDURE — 97110 THERAPEUTIC EXERCISES: CPT

## 2021-09-30 NOTE — PROGRESS NOTES
PT DAILY TREATMENT NOTE 8-    Patient Name: Jeffy Smith  Date:2021  : 1990  [x]  Patient  Verified  Payor: BLUE CROSS MEDICAID / Plan: [facility] [employer] HEALTHKEEPERS PLUS / Product Type: Managed Care Medicaid /    In time:1357  Out time:1459  Total Treatment Time (min): 62  Total Timed Codes (min): 62    Visit #: 8 of 18    Treatment Area: Compression of brain [G93.5]    SUBJECTIVE  Pt reported that she could tell she was feeling better. Pain Level (0-10 scale): 3    Any medication changes, allergies to medications, adverse drug reactions, diagnosis change, or new procedure performed?: [x] No    [] Yes (see summary sheet for update)        OBJECTIVE      62 min Therapeutic Exercise:  [x] See flow sheet :   Rationale: increase ROM, increase strength, improve coordination and improve balance to improve the patients ability to return to prior level of function before injury/illness with reduced pain, achieving optimal strength and function to perform household tasks, daily activities, and return to community events, and/or work. With TE  TA   NR  GT   Misc Patient Education: [x] Review HEP    [] Progressed/Changed HEP based on:   [] positioning   [] body mechanics   [] transfers   [] heat/ice application          Patient's response to today's treatment: Began session with seated neck stretches and isometrics, followed by thoracic extension, B UE resistive strengthening and supine wand for improved ROM. Pulleys to improve shoulder mobilty and decrease muscle tightness. Cont POC. Pain Level (0-10 scale) post treatment: 2    ASSESSMENT/Changes in Function: Continued with POC with exercises as noted per flow sheet. Pt able to tolerate today's session with minimal increase in pain, which resolved post exercise. Will cont to progress as able.       Patient will continue to benefit from skilled PT services to address functional mobility deficits, address ROM deficits, address strength deficits, analyze and cue movement patterns and analyze and modify body mechanics/ergonomics to attain remaining goals.      [x]  See Plan of Care  []  See progress note/recertification  []  See Discharge Summary           PLAN  []  Upgrade activities as tolerated     [x]  Continue plan of care  []  Update interventions per flow sheet       []  Discharge due to:_  []  Other:_      VAUGHN Willard 9/30/2021  4:55 PM

## 2021-10-05 ENCOUNTER — HOSPITAL ENCOUNTER (OUTPATIENT)
Dept: PHYSICAL THERAPY | Age: 31
Discharge: HOME | End: 2021-10-05
Payer: MEDICAID

## 2021-10-05 PROCEDURE — 97110 THERAPEUTIC EXERCISES: CPT

## 2021-10-05 NOTE — PROGRESS NOTES
PT DAILY TREATMENT NOTE 8-    Patient Name: Cintia Stoner  Date:10/5/2021  : 1990  [x]  Patient  Verified  Payor: BLUE CROSS MEDICAID / Plan: [facility] HEALTHKEEPERS PLUS / Product Type: Managed Care Medicaid /    In time:  Out time:162  Total Treatment Time (min): 57  Total Timed Codes (min): 57    Visit #: 9 of 18    Treatment Area: Compression of brain [G93.5]    SUBJECTIVE  Pt reported some soreness as she has been working, but otherwise no c/o. States that she feels she is getting back to normal with therapy. Pain Level (0-10 scale): 5    Any medication changes, allergies to medications, adverse drug reactions, diagnosis change, or new procedure performed?: [x] No    [] Yes (see summary sheet for update)        OBJECTIVE  Modality rationale: No modalities requested today.     Min Type Additional Details    [] Estim: []Att   []Unatt  []TENS instruct                 []IFC  []Premod []NMES                       []Other:  []w/US   []w/ice   []w/heat  Position:  Location:    []  Traction: [] Cervical       []Lumbar                       [] Prone          []Supine                       []Intermittent   []Continuous Lbs:  [] before manual  [] after manual    []  Ultrasound: []Continuous   [] Pulsed                           []1MHz   []3MHz Location:  W/cm2:    []  Ice     []  heat  []  Ice massage Position:  Location:    []  Vasopneumatic Device Pressure: [] lo [] med [] hi   Temp: [] lo [] med [] hi   [] Skin assessment post-treatment:  []intact []redness- no adverse reaction       []redness  adverse reaction:       57 min Therapeutic Exercise:  [x] See flow sheet :   Rationale: increase ROM, increase strength, improve coordination and increase proprioception to improve the patients ability to return to prior level of function before injury/illness with reduced pain, achieving optimal strength and function to perform household tasks, daily activities, and return to community events, and/or work.              With TE  SENG Mayer Patient Education: [x] Review HEP    [] Progressed/Changed HEP based on:   [] positioning   [] body mechanics   [] transfers   [] heat/ice application          Patient's response to today's treatment: Began session today with active warm up on pulleys to increase cervical/shoulder ROM, followed by new strengthening exercises: retraction/lats/IR, ER with tband, seated extension, and supine AAROM with cane, supine AROM, SL AROM to improve joint mobility in an effort to decrease muscle tightness in B shoulder and cervical region. No modalities requested today. Decreased pain post session. Cont POC. Pain Level (0-10 scale) post treatment: 2    ASSESSMENT/Changes in Function: Continued with POC with exercises as noted per flow sheet. Pt able to tolerate today's session with minimal increase in pain, which resolved post exercise. Will cont to progress as able. Patient will continue to benefit from skilled PT services to modify and progress therapeutic interventions, address functional mobility deficits, address ROM deficits, address strength deficits and analyze and cue movement patterns to attain remaining goals.      [x]  See Plan of Care  []  See progress note/recertification  []  See Discharge Summary           PLAN  []  Upgrade activities as tolerated     [x]  Continue plan of care  []  Update interventions per flow sheet       []  Discharge due to:_  []  Other:_      VAUGHN Hay 10/5/2021  5:18 PM

## 2021-10-07 ENCOUNTER — HOSPITAL ENCOUNTER (OUTPATIENT)
Dept: PHYSICAL THERAPY | Age: 31
Discharge: HOME | End: 2021-10-07
Payer: MEDICAID

## 2021-10-07 PROCEDURE — 97110 THERAPEUTIC EXERCISES: CPT

## 2021-10-07 NOTE — PROGRESS NOTES
PT DAILY TREATMENT NOTE 8-    Patient Name: Jaci Najera  Date:10/7/2021  : 1990  [x]  Patient  Verified  Payor: BLUE CROSS MEDICAID / Plan: [facility] HEALTHKEEPERS PLUS / Product Type: Managed Care Medicaid /    In time:1429  Out time:1503  Total Treatment Time (min): 34  Total Timed Codes (min): 24  1:1 Treatment Time (min): 24   Visit # 10 of 18      Treatment Area: Compression of brain [G93.5]    SUBJECTIVE  Pt reports increased pain today related to accidental fall due to faulty chair yesterday. Pt reports HEP adherence. Pt scheduled to have sinus surgery next week.    Pain Level (0-10 scale): 7/10  Any medication changes, allergies to medications, adverse drug reactions, diagnosis change, or new procedure performed?: [x] No    [] Yes (see summary sheet for update)        OBJECTIVE  Modality rationale: decrease pain and increase tissue extensibility to improve the patients ability to participate in session   Min Type Additional Details    [] Estim: []Att   []Unatt  []TENS instruct                 []IFC  []Premod []NMES                       []Other:  []w/US   []w/ice   []w/heat  Position:  Location:    []  Traction: [] Cervical       []Lumbar                       [] Prone          []Supine                       []Intermittent   []Continuous Lbs:  [] before manual  [] after manual    []  Ultrasound: []Continuous   [] Pulsed                           []1MHz   []3MHz Location:  W/cm2:        10 []  Ice     [x]  heat  []  Ice massage Position: propped sitting  Location: cervical    []  Vasopneumatic Device Pressure: [] lo [] med [] hi   Temp: [] lo [] med [] hi   [] Skin assessment post-treatment:  []intact []redness- no adverse reaction       []redness  adverse reaction:       24 min Therapeutic Exercise:  [x] See flow sheet :   Rationale: increase ROM and increase strength to improve the patients ability to maximize pain-free daily activity, restore PLOF          With TE Patient Education: [x] Review HEP    [] Progressed/Changed HEP based on:   [] positioning   [] body mechanics   [] transfers   [x] heat/ice application          Pain Level (0-10 scale) post treatment: 4/10    ASSESSMENT/Changes in Function: Pt seen today for reassessment of cervical and shoulder AROM, UE strength, function and HEP review. Progress measured in all planes, will continue to address deficits and progress as able in coming weeks.      []  See Plan of Care  [x]  See progress note/recertification  []  See Discharge Summary             PLAN  []  Upgrade activities as tolerated     []  Continue plan of care  [x]  Update interventions per flow sheet       []  Discharge due to:_  []  Other:_      Sue Schaefer, PT, DPT 10/7/2021  2:33 PM

## 2021-10-07 NOTE — PROGRESS NOTES
[address]  Phone: [phone]    Fax: [phone]   Progress Note/CONTINUED PLAN OF CARE for PHYSICAL THERAPY          Patient Name: Perry Maldonado : 1990   Treatment/Medical Diagnosis: Compression of brain [G93.5]   Onset Date: 2021    Referral Source: Mayito Tidwell MD Start of Care [facility]): 2021   Prior Hospitalization: See Medical History Provider #: [id number]   Prior Level of Function: Independent   Comorbidities: Arthritis, Depression, GERD   Medications: Verified on Patient Summary List   Visits from [facility]: 10 Missed Visits: 0       Objective Measures:  Palpation: incision healing appropriatly, palpatory tenderness most noted along CTJ, cervical parapsinals      Posture:   Head Position: forward  Shoulder/Scapular Position: rounded  C-Kyphosis:     []? ? increased   []? ? decreased   C-Lordosis:      [x]? ? increased   []? ? decreased  T-Kyphosis:     [x]? ? increased   []? ? decreased     Shoulder/Scapular Screen: []?? WNL    []? ? Abnormal:     Active Movements: []?? N/A   []? ? Too acute   []? ? Other:  ROM AROM Comments:pain, area   Forward flexion 34     Extension 34     SB right 38     SB left  32     Rotation right 60     Rotation left 62                                                                    AROM                     PROM                       MMT      Left Right Left Right Left Right   Shoulder Flexion 150 154     5/5 5/5     Abduction 160 150     5/5 5/5     Extension [facility]     5/5 5/5     IR T9 level L1 level     5/5 5/5     ER [facility]     5/5 4+/5     Horizontal Add [facility] WFL             Horizontal Abd [facility] WFL           Elbow Flexion [facility] WFL     5/5 5/5     Extension [facility]     4+/5 4+/5     Prontation [facility]     5/5 4+/5     Supination [facility] WFL     4+/5 4+/5   Wrist  Flexion [facility] WF     5 4+/5     Extension [facility]      4+/5   - right wrist/forearm weakness related to history of wrist sprain     Muscle Flexibility:               Upper Trap:     []? ? WNL    [x]? ? Tight    []? ? R    []? ? L              Levator:           []? ? WNL    [x]? ? Tight    []? ? R    []? ? L              Pect. Minor:     []? ? WNL    [x]? ? Tight    []? ? R    []? ? L           Short Term Goals:  1. Pt will be independent with HEP to improve cervical and UE AROM, postural awareness and stabilization in 3 weeks. MET. 2. Pt will improve cervical AROM by atleast 5 degrees in all planes for improved ability to scan environment while driving in 3 weeks. MET. 3. Pt will report no greater than 6/10 pain in cervical region with all daily activity in 3 weeks. Progressing, pt reports 7/10 pain s/p fall last week.     Long Term Goals:  1. Pt will improve B shoulder flexion & abduction AROM to >140 degrees for improved overhead reach in 6 weeks. MET, as listed above. 2. Pt will improve B shoulder strength by 1/2 MMT grade for improved ability to perform leisure activities in 6 weeks. Progressing, as listed above. 3. Pt will report no greater than 2/10 pain in cervical region with daily activity in 6 weeks. Progressing. 4. Pt will improve NDI score to <20% for improved functional mobility & quality of life in 6 weeks. Met, NDI score 16.3 today.           Assessment/ Patient Update: Pt is s/p posterior fossa craniectomy, C1 laminectomy for Chiari malformation decompression 6/18/2021 and has made measurable progress over past 6 weeks in outpatient PT in cervical and shoulder AROM, UE strength, postural awareness and function. Pt will benefit from continued skilled PT intervention to target deficits in effort to maximize pain-free daily activity, restore function and independence within home, classroom and community.      Problem List: pain affecting function, decrease ROM, decrease strength, decrease ADL/ functional abilitiies, decrease activity tolerance and decrease flexibility/ joint mobility     Updated Plan of Care:    Treatment Plan to include the following per provider discretion: Therapeutic exercise, Neuromuscular re-education, Physical agent/modality, Manual therapy, Patient education, Self Care training and Functional mobility training    Frequency / Duration:  Patient to be seen   2   times per week for   6  weeks        If you have any questions/comments please contact us directly at [phone]. Thank you for allowing us to assist in the care of your patient. Therapist Signature: Pineda Sweet PT, DPT Date: 84/8/4157   Certification Period:  Reporting Period: 10/07/2021 - 12/31/2021 08/17/2021-10/7/2021 Time: 2:35 PM   NOTE TO PHYSICIAN:  PLEASE COMPLETE THE ORDERS BELOW AND FAX TO   [facility]: [phone]. If you are unable to process this request in 24 hours please contact our office: [phone].    ___ I have read the above report and request that my patient continue as recommended.   ___ I have read the above report and request that my patient continue therapy with the following changes/special instructions: ________________________________________________   ___ I have read the above report and request that my patient be discharged from therapy.      Physician Signature:        Date:       Time:

## 2021-10-15 ENCOUNTER — HOSPITAL ENCOUNTER (OUTPATIENT)
Dept: PHYSICAL THERAPY | Age: 31
Discharge: HOME | End: 2021-10-15
Payer: MEDICAID

## 2021-10-15 PROCEDURE — 97110 THERAPEUTIC EXERCISES: CPT

## 2021-10-15 NOTE — PROGRESS NOTES
PT DAILY TREATMENT NOTE 8-    Patient Name: Jessee Wilson  Date:10/15/2021  : 1990  [x]  Patient  Verified  Payor: BLUE CROSS MEDICAID / Plan: [facility] HEALTHKEEPERS PLUS / Product Type: Managed Care Medicaid /    In time:1300  Out time:1340  Total Treatment Time (min): 40  Total Timed Codes (min): 40    Visit #: 11 of 18    Treatment Area: Compression of brain [G93.5]    SUBJECTIVE  Pt reported some soreness as she has been working, but otherwise no c/o. States that she feels she is getting back to normal with therapy. Pain Level (0-10 scale): 0    Any medication changes, allergies to medications, adverse drug reactions, diagnosis change, or new procedure performed?: [x] No    [] Yes (see summary sheet for update)        OBJECTIVE  Modality rationale: No modalities requested today.     Min Type Additional Details    [] Estim: []Att   []Unatt  []TENS instruct                 []IFC  []Premod []NMES                       []Other:  []w/US   []w/ice   []w/heat  Position:  Location:    []  Traction: [] Cervical       []Lumbar                       [] Prone          []Supine                       []Intermittent   []Continuous Lbs:  [] before manual  [] after manual    []  Ultrasound: []Continuous   [] Pulsed                           []1MHz   []3MHz Location:  W/cm2:    []  Ice     []  heat  []  Ice massage Position:  Location:    []  Vasopneumatic Device Pressure: [] lo [] med [] hi   Temp: [] lo [] med [] hi   [] Skin assessment post-treatment:  []intact []redness- no adverse reaction       []redness  adverse reaction:       40 min Therapeutic Exercise:  [x] See flow sheet :   Rationale: increase ROM, increase strength, improve coordination and increase proprioception to improve the patients ability to return to prior level of function before injury/illness with reduced pain, achieving optimal strength and function to perform household tasks, daily activities, and return to community events, and/or work.              With TE  SENG Mayer Patient Education: [x] Review HEP    [] Progressed/Changed HEP based on:   [] positioning   [] body mechanics   [] transfers   [] heat/ice application          Patient's response to today's treatment: Began session with seated ball  Roll outs, and thoracic extension, followed by PRE exercises to build UE strength and improve posture. AROM to promote full ROM against gravity with reaching. Cont POC. Pain Level (0-10 scale) post treatment: 2    ASSESSMENT/Changes in Function: Continued with POC with exercises as noted per flow sheet. Pt able to tolerate today's session with minimal increase in pain, which resolved post exercise. Will cont to progress as able. Patient will continue to benefit from skilled PT services to modify and progress therapeutic interventions, address functional mobility deficits, address ROM deficits, address strength deficits and analyze and cue movement patterns to attain remaining goals.      [x]  See Plan of Care  []  See progress note/recertification  []  See Discharge Summary           PLAN  []  Upgrade activities as tolerated     [x]  Continue plan of care  []  Update interventions per flow sheet       []  Discharge due to:_  []  Other:_      VAUGHN Dillon 10/15/2021  5:18 PM

## 2021-10-19 ENCOUNTER — HOSPITAL ENCOUNTER (OUTPATIENT)
Dept: PHYSICAL THERAPY | Age: 31
Discharge: HOME | End: 2021-10-19
Payer: MEDICAID

## 2021-10-19 PROCEDURE — 97110 THERAPEUTIC EXERCISES: CPT

## 2021-10-19 NOTE — PROGRESS NOTES
PT DAILY TREATMENT NOTE 8-    Patient Name: Jessee Wilson  Date:10/19/2021  : 1990  [x]  Patient  Verified  Payor: BLUE CROSS MEDICAID / Plan: [address] [place] [address] / Product Type: Managed Care Medicaid /    In time: 1028 Out time:1528  Total Treatment Time (min): 47  Total Timed Codes (min):47    Visit #: 12 of 18    Treatment Area: Compression of brain [G93.5]    SUBJECTIVE  Pt reported some soreness as she has been working, but otherwise no c/o. States that she feels she is getting back to normal with therapy. Pain Level (0-10 scale): 0    Any medication changes, allergies to medications, adverse drug reactions, diagnosis change, or new procedure performed?: [x] No    [] Yes (see summary sheet for update)        OBJECTIVE  Modality rationale: No modalities requested today.     Min Type Additional Details    [] Estim: []Att   []Unatt  []TENS instruct                 []IFC  []Premod []NMES                       []Other:  []w/US   []w/ice   []w/heat  Position:  Location:    []  Traction: [] Cervical       []Lumbar                       [] Prone          []Supine                       []Intermittent   []Continuous Lbs:  [] before manual  [] after manual    []  Ultrasound: []Continuous   [] Pulsed                           []1MHz   []3MHz Location:  W/cm2:    []  Ice     []  heat  []  Ice massage Position:  Location:    []  Vasopneumatic Device Pressure: [] lo [] med [] hi   Temp: [] lo [] med [] hi   [] Skin assessment post-treatment:  []intact []redness- no adverse reaction       []redness  adverse reaction:       43 min Therapeutic Exercise:  [x] See flow sheet :   Rationale: increase ROM, increase strength, improve coordination and increase proprioception to improve the patients ability to return to prior level of function before injury/illness with reduced pain, achieving optimal strength and function to perform household tasks, daily activities, and return to community events, and/or work.              With TE  SENG Mayer Patient Education: [x] Review HEP    [] Progressed/Changed HEP based on:   [] positioning   [] body mechanics   [] transfers   [] heat/ice application          Patient's response to today's treatment: Began session with seated ball  Roll outs, and thoracic extension, followed by PRE exercises to build UE strength and improve posture. AROM to promote full ROM against gravity with reaching. Added restive bands reverse shoulder Ts today. Cont POC. Pain Level (0-10 scale) post treatment: 2    ASSESSMENT/Changes in Function: Continued with POC with exercises as noted per flow sheet. Pt able to tolerate today's session with minimal increase in pain, which resolved post exercise. Will cont to progress as able. Patient will continue to benefit from skilled PT services to modify and progress therapeutic interventions, address functional mobility deficits, address ROM deficits, address strength deficits and analyze and cue movement patterns to attain remaining goals.      [x]  See Plan of Care  []  See progress note/recertification  []  See Discharge Summary           PLAN  []  Upgrade activities as tolerated     [x]  Continue plan of care  []  Update interventions per flow sheet       []  Discharge due to:_  []  Other:_      VAUGHN Morton 10/19/2021  5:18 PM

## 2021-10-21 ENCOUNTER — HOSPITAL ENCOUNTER (OUTPATIENT)
Dept: PHYSICAL THERAPY | Age: 31
Discharge: HOME | End: 2021-10-21
Payer: MEDICAID

## 2021-10-21 PROCEDURE — 97110 THERAPEUTIC EXERCISES: CPT

## 2021-10-21 NOTE — PROGRESS NOTES
PT DAILY TREATMENT NOTE 8    Patient Name: Anne Macias  Date:10/21/2021  : 1990  [x]  Patient  Verified  Payor: BLUE CROSS MEDICAID / Plan: [facility] HEALTHKEEPERS PLUS / Product Type: Managed Care Medicaid /    In time: 4040 Out time:1522  Total Treatment Time (min): 46  Total Timed Codes (min):46    Visit #: 13 of 18    Treatment Area: Compression of brain [G93.5]    SUBJECTIVE  Pt reported some soreness as she has been working. Otherwise no new c/o. Pain Level (0-10 scale): 0    Any medication changes, allergies to medications, adverse drug reactions, diagnosis change, or new procedure performed?: [x] No    [] Yes (see summary sheet for update)        OBJECTIVE  Modality rationale: No modalities requested today. Min Type Additional Details    [] Estim: []Att   []Unatt  []TENS instruct                 []IFC  []Premod []NMES                       []Other:  []w/US   []w/ice   []w/heat  Position:  Location:    []  Traction: [] Cervical       []Lumbar                       [] Prone          []Supine                       []Intermittent   []Continuous Lbs:  [] before manual  [] after manual    []  Ultrasound: []Continuous   [] Pulsed                           []1MHz   []3MHz Location:  W/cm2:    []  Ice     []  heat  []  Ice massage Position:  Location:    []  Vasopneumatic Device Pressure: [] lo [] med [] hi   Temp: [] lo [] med [] hi   [] Skin assessment post-treatment:  []intact []redness- no adverse reaction       []redness  adverse reaction:       46 min Therapeutic Exercise:  [x] See flow sheet :   Rationale: increase ROM, increase strength, improve coordination and increase proprioception to improve the patients ability to return to prior level of function before injury/illness with reduced pain, achieving optimal strength and function to perform household tasks, daily activities, and return to community events, and/or work.               With ROCHELLE  TA   NR  GT   Misc Patient Education: [x] Review HEP    [] Progressed/Changed HEP based on:   [] positioning   [] body mechanics   [] transfers   [] heat/ice application          Patient's response to today's treatment: Began session with seated ball  Roll outs, and thoracic extension, followed by PRE exercises to build UE strength and improve posture. AROM to promote full ROM against gravity with reaching. Added restive bands reverse shoulder Ts today. Cont POC. Pain Level (0-10 scale) post treatment: 1    ASSESSMENT/Changes in Function: Continued with POC with exercises as noted per flow sheet. Pt able to tolerate today's session with minimal increase in pain, which resolved post exercise. Will cont to progress as able. Patient will continue to benefit from skilled PT services to modify and progress therapeutic interventions, address functional mobility deficits, address ROM deficits, address strength deficits and analyze and cue movement patterns to attain remaining goals.      [x]  See Plan of Care  []  See progress note/recertification  []  See Discharge Summary           PLAN  []  Upgrade activities as tolerated     [x]  Continue plan of care  []  Update interventions per flow sheet       []  Discharge due to:_  []  Other:_      VAUGHN Hunt 10/21/2021  5:18 PM

## 2021-10-25 ENCOUNTER — HOSPITAL ENCOUNTER (OUTPATIENT)
Dept: PHYSICAL THERAPY | Age: 31
Discharge: HOME | End: 2021-10-25
Payer: MEDICAID

## 2021-10-25 PROCEDURE — 97110 THERAPEUTIC EXERCISES: CPT

## 2021-10-25 NOTE — PROGRESS NOTES
PT DAILY TREATMENT NOTE 8    Patient Name: Bobby Espinal  Date:10/25/2021  : 1990  [x]  Patient  Verified  Payor: BLUE CROSS MEDICAID / Plan: [address] [place] [address] / Product Type: Managed Care Medicaid /    In time: 143 Out time: 7854  Total Treatment Time (min): 54  Total Timed Codes (min):54    Visit #: 14 of 18    Treatment Area: Compression of brain [G93.5]    SUBJECTIVE  Pt reported some soreness as she has been working. Pain Level (0-10 scale): 0    Any medication changes, allergies to medications, adverse drug reactions, diagnosis change, or new procedure performed?: [x] No    [] Yes (see summary sheet for update)        OBJECTIVE  Modality rationale: No modalities requested today. Min Type Additional Details    [] Estim: []Att   []Unatt  []TENS instruct                 []IFC  []Premod []NMES                       []Other:  []w/US   []w/ice   []w/heat  Position:  Location:    []  Traction: [] Cervical       []Lumbar                       [] Prone          []Supine                       []Intermittent   []Continuous Lbs:  [] before manual  [] after manual    []  Ultrasound: []Continuous   [] Pulsed                           []1MHz   []3MHz Location:  W/cm2:    []  Ice     []  heat  []  Ice massage Position:  Location:    []  Vasopneumatic Device Pressure: [] lo [] med [] hi   Temp: [] lo [] med [] hi   [] Skin assessment post-treatment:  []intact []redness- no adverse reaction       []redness  adverse reaction:       54 min Therapeutic Exercise:  [x] See flow sheet :   Rationale: increase ROM, increase strength, improve coordination and increase proprioception to improve the patients ability to return to prior level of function before injury/illness with reduced pain, achieving optimal strength and function to perform household tasks, daily activities, and return to community events, and/or work.               With TE  TA   NR  GT   Misc Patient Education: [x] Review HEP    [] Progressed/Changed HEP based on:   [] positioning   [] body mechanics   [] transfers   [] heat/ice application          Patient's response to today's treatment: Began session with seated ball  Roll outs, and thoracic extension, followed by PRE exercises to build UE strength and improve posture. AROM to promote full ROM against gravity with reaching. Added restive bands reverse shoulder Ts today. Cont POC. Pain Level (0-10 scale) post treatment: 1    ASSESSMENT/Changes in Function: Continued with POC with exercises as noted per flow sheet. Pt able to tolerate today's session with minimal increase in pain, which resolved post exercise. Will cont to progress as able. Patient will continue to benefit from skilled PT services to modify and progress therapeutic interventions, address functional mobility deficits, address ROM deficits, address strength deficits and analyze and cue movement patterns to attain remaining goals.      [x]  See Plan of Care  []  See progress note/recertification  []  See Discharge Summary           PLAN  []  Upgrade activities as tolerated     [x]  Continue plan of care  []  Update interventions per flow sheet       []  Discharge due to:_  []  Other:_      VAUGHN Carmona 10/25/2021  5:18 PM

## 2021-10-27 ENCOUNTER — HOSPITAL ENCOUNTER (OUTPATIENT)
Dept: PHYSICAL THERAPY | Age: 31
Discharge: HOME | End: 2021-10-27
Payer: MEDICAID

## 2021-10-27 PROCEDURE — 97110 THERAPEUTIC EXERCISES: CPT

## 2021-10-27 NOTE — PROGRESS NOTES
PT DAILY TREATMENT NOTE     Patient Name: Anne Macias  Date:10/27/2021  : 1990  [x]  Patient  Verified  Payor: BLUE CROSS MEDICAID / Plan: [address] [place] [address] / Product Type: Managed Care Medicaid /    In time:  Out time: 160  Total Treatment Time (min): 39  Total Timed Codes (min):39  1:1 Treatment Time (min): 39  Visit #: 15 of 18    Treatment Area: Compression of brain [G93.5]    SUBJECTIVE  Pt reported some soreness as she has been working. Pain Level (0-10 scale): 0    Any medication changes, allergies to medications, adverse drug reactions, diagnosis change, or new procedure performed?: [x] No    [] Yes (see summary sheet for update)        OBJECTIVE  Modality rationale: No modalities requested today. Min Type Additional Details    [] Estim: []Att   []Unatt  []TENS instruct                 []IFC  []Premod []NMES                       []Other:  []w/US   []w/ice   []w/heat  Position:  Location:    []  Traction: [] Cervical       []Lumbar                       [] Prone          []Supine                       []Intermittent   []Continuous Lbs:  [] before manual  [] after manual    []  Ultrasound: []Continuous   [] Pulsed                           []1MHz   []3MHz Location:  W/cm2:    []  Ice     []  heat  []  Ice massage Position:  Location:    []  Vasopneumatic Device Pressure: [] lo [] med [] hi   Temp: [] lo [] med [] hi   [] Skin assessment post-treatment:  []intact []redness- no adverse reaction       []redness  adverse reaction:       39 min Therapeutic Exercise:  [x] See flow sheet :   Rationale: increase ROM, increase strength, improve coordination and increase proprioception to improve the patients ability to return to prior level of function before injury/illness with reduced pain, achieving optimal strength and function to perform household tasks, daily activities, and return to community events, and/or work.               With ROCHELLE ELLSWORTH   NR  GT   Misc Patient Education: [x] Review HEP    [] Progressed/Changed HEP based on:   [] positioning   [] body mechanics   [] transfers   [] heat/ice application          Patient's response to today's treatment: Began session with seated ball  Roll outs, and thoracic extension, followed by PRE exercises to build UE strength and improve posture, (retractions upgraded resistance today). AROM to promote full ROM against gravity with reaching. Cont POC. Pain Level (0-10 scale) post treatment: 0    ASSESSMENT/Changes in Function: Continued with POC with exercises as noted per flow sheet. Pt able to tolerate today's session with minimal increase in pain, which resolved post exercise. Will cont to progress as able. Patient will continue to benefit from skilled PT services to modify and progress therapeutic interventions, address functional mobility deficits, address ROM deficits, address strength deficits and analyze and cue movement patterns to attain remaining goals.      [x]  See Plan of Care  []  See progress note/recertification  []  See Discharge Summary           PLAN  []  Upgrade activities as tolerated     [x]  Continue plan of care  []  Update interventions per flow sheet       []  Discharge due to:_  []  Other:_      VAUGHN Hay 10/27/2021  5:18 PM

## 2021-11-02 ENCOUNTER — HOSPITAL ENCOUNTER (OUTPATIENT)
Dept: PHYSICAL THERAPY | Age: 31
Discharge: HOME | End: 2021-11-02
Payer: MEDICAID

## 2021-11-02 PROCEDURE — 97110 THERAPEUTIC EXERCISES: CPT

## 2021-11-02 NOTE — PROGRESS NOTES
PT DAILY TREATMENT NOTE 8-14    Patient Name: Casey Damon  Date:2021  : 1990  [x]  Patient  Verified  Payor: BLUE CROSS MEDICAID / Plan: [facility] HEALTHKEEPERS PLUS / Product Type: Managed Care Medicaid /    In time:1433  Out time:1513  Total Treatment Time (min): 40  Total Timed Codes (min): 40  1:1 Treatment Time (min): 40   Visit # 16      Treatment Area: Compression of brain [G93.5]    SUBJECTIVE  Pt reports soreness upon arrival today, states has been helping her dad change oil and shocks on her truck today. Pain Level (0-10 scale): 0/10, soreness  Any medication changes, allergies to medications, adverse drug reactions, diagnosis change, or new procedure performed?: [x] No    [] Yes (see summary sheet for update)        OBJECTIVE    40 min Therapeutic Exercise:  [x] See flow sheet :   Rationale: increase ROM, increase strength, improve coordination and increase proprioception to improve the patients ability to restore PLOF         With TE Patient Education: [x] Review HEP    [] Progressed/Changed HEP based on:   [] positioning   [] body mechanics   [] transfers   [] heat/ice application          Pain Level (0-10 scale) post treatment: 0/10, soreness    ASSESSMENT/Changes in Function: Continued today with focus on periscapular strengthening, shoulder endurance and postural awareness. Standing activities progressed via repetition without complaints. Chin tucks performed in supine to promote cervical endurance & stabilization. Cervical rotation targeted via towel self-stretch with good stretch reported & observed, pain-free. Triceps PRE performed in supine, unilaterally with cues for hand placement & control. Shoulder ER introduced in sideyling to address strength. Rest breaks provided in between sets as needed. Pt encouraged to perform supine PRE with HEP.      Patient will continue to benefit from skilled PT services to modify and progress therapeutic interventions, address functional mobility deficits, address ROM deficits, address strength deficits, analyze and address soft tissue restrictions, analyze and cue movement patterns, analyze and modify body mechanics/ergonomics and assess and modify postural abnormalities to attain remaining goals.      [x]  See Plan of Care  []  See progress note/recertification  []  See Discharge Summary             PLAN  [x]  Upgrade activities as tolerated     [x]  Continue plan of care  []  Update interventions per flow sheet       []  Discharge due to:_  []  Other:_      Ama Rogers PT, DPT 11/2/2021  2:33 PM

## 2021-11-05 ENCOUNTER — HOSPITAL ENCOUNTER (OUTPATIENT)
Dept: PHYSICAL THERAPY | Age: 31
Discharge: HOME | End: 2021-11-05
Payer: MEDICAID

## 2021-11-05 PROCEDURE — 97110 THERAPEUTIC EXERCISES: CPT

## 2021-11-05 NOTE — PROGRESS NOTES
PT DAILY TREATMENT NOTE 8-14    Patient Name: Bryant Howe  Date:2021  : 1990  [x]  Patient  Verified  Payor: BLUE CROSS MEDICAID / Plan: [facility] HEALTHKEEPERS PLUS / Product Type: Managed Care Medicaid /    In time:103  Out time:159  Total Treatment Time (min): 56  Total Timed Codes (min): 56  1:1 Treatment Time (min): 56  Visit #: 2 of 18    Treatment Area: Compression of brain [G93.5]    SUBJECTIVE  Pt reported minimal pain in cervical region and shoulders, experienced headache 2 days prior to treatment but pain resolved on its own. Pain Level (0-10 scale): 0/10    Any medication changes, allergies to medications, adverse drug reactions, diagnosis change, or new procedure performed?: [x] No    [] Yes (see summary sheet for update)        OBJECTIVE        56 min Therapeutic Exercise:  [x] See flow sheet :   Rationale: increase ROM, increase strength and increase proprioception to improve the patients ability to function with pain free ROM throughout patients daily movements. Rationale: With TE  TA   NR  GT   [facility] Patient Education: [x] Review HEP    [] Progressed/Changed HEP based on:   [] positioning   [] body mechanics   [] transfers   [] heat/ice application          Patient's response to today's treatment: Pt was able to complete exercises per flow sheet with minimal issue and continued to progress with recently introduced exercises per flow sheet. Pt required minor tactile/verbal que's for triceps PRE's as well as R shoulder ER PREs for proper form. Newly introduced exercises per flow sheet added a new challenge as pt reported muscle soreness post treatment. Pain Level (0-10 scale) post treatment: 2/10    ASSESSMENT/Changes in Function: Pt performed all regular activities with minimal difficulty. Continued therapy and strength training with benefit the patients ability to perform daily ROM.     Patient will continue to benefit from skilled PT services to modify and progress therapeutic interventions, address functional mobility deficits, address ROM deficits, address strength deficits, analyze and address soft tissue restrictions and assess and modify postural abnormalities to attain remaining goals.      [x]  See Plan of Care  []  See progress note/recertification  []  See Discharge Summary           PLAN  []  Upgrade activities as tolerated     [x]  Continue plan of care  []  Update interventions per flow sheet       []  Discharge due to:_  []  Other:_      VAUGHN Cornell 11/5/2021  3:35 PM  Yared LAMBERT

## 2021-11-09 ENCOUNTER — HOSPITAL ENCOUNTER (OUTPATIENT)
Dept: PHYSICAL THERAPY | Age: 31
Discharge: HOME | End: 2021-11-09
Payer: MEDICAID

## 2021-11-09 PROCEDURE — 97110 THERAPEUTIC EXERCISES: CPT

## 2021-11-09 NOTE — PROGRESS NOTES
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 Chief complaint is soreness in cervical region. Pain Level (0-10 scale): 0    Any medication changes, allergies to medications, adverse drug reactions, diagnosis change, or new procedure performed?: [x] No    [] Yes (see summary sheet for update)    OBJECTIVE  Modality rationale: Declined   Min Type Additional Details    [] Estim: []Att   []Unatt  []TENS instruct                 []IFC  []Premod []NMES                       []Other:  []w/US   []w/ice   []w/heat  Position:  Location:    []  Traction: [] Cervical       []Lumbar                       [] Prone          []Supine                       []Intermittent   []Continuous Lbs:  [] before manual  [] after manual    []  Ultrasound: []Continuous   [] Pulsed                           []1MHz   []3MHz Location:  W/cm2:    []  Ice     []  heat  []  Ice massage Position:  Location:    []  Vasopneumatic Device Pressure: [] lo [] med [] hi   Temp: [] lo [] med [] hi   [] Skin assessment post-treatment:  []intact []redness- no adverse reaction       []redness  adverse reaction:     41 min Therapeutic Exercise:  [] See flow sheet :   Rationale: increase ROM, increase strength, improve coordination and increase proprioception to improve the patients ability to complete household and leisure activities pain free.             With TE  TA   NR  GT   Misc Patient Education: [x] Review HEP    [] Progressed/Changed HEP based on:   [] positioning   [] body mechanics   [] transfers   [] heat/ice application        Pain Level (0-10 scale) post treatment: 0    ASSESSMENT/Changes in Function: Exercises completed per flowsheet working to achieve long term goals of improving B shoulder strength by 1/2 MMT grade for improved ability to perform leisure activities in 6 weeks and patient reporting no greater than 2/10 pain in cervical region with daily activity in 6 weeks. Pt remained pain free throughout today's session. Will continue POC and progress as able. Patient will continue to benefit from skilled PT services to modify and progress therapeutic interventions, address functional mobility deficits, address ROM deficits, address strength deficits, analyze and cue movement patterns, analyze and modify body mechanics/ergonomics and assess and modify postural abnormalities to attain remaining goals.      [x]  See Plan of Care  []  See progress note/recertification  []  See Discharge Summary        PLAN  []  Upgrade activities as tolerated     [x]  Continue plan of care  []  Update interventions per flow sheet       []  Discharge due to:_  []  Other:_      VAUGHN Blair  11/9/2021  3:50 PM

## 2021-11-11 ENCOUNTER — HOSPITAL ENCOUNTER (OUTPATIENT)
Dept: PHYSICAL THERAPY | Age: 31
Discharge: HOME | End: 2021-11-11
Payer: MEDICAID

## 2021-11-11 PROCEDURE — 97110 THERAPEUTIC EXERCISES: CPT

## 2021-11-11 NOTE — PROGRESS NOTES
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]   Onset Date: 21    Referral Source: Phyllis Mahan MD Start of Care [facility]): 21   Prior Hospitalization: See Medical History Provider #: [id number]   Prior Level of Function: independent   Comorbidities: Arthritis, Depression, GERD   Medications: Verified on Patient Summary List   Visits from [facility]: 19 Missed Visits: 0       Objective Measures:  Palpation: incision healing appropriatly, palpatory tenderness most noted along CTJ, cervical parapsinals      Posture:   Head Position: forward  Shoulder/Scapular Position: rounded  C-Lordosis:      [x]? ?? increased   []? ?? decreased  T-Kyphosis:     [x]? ?? increased   []? ?? decreased     Shoulder/Scapular Screen: []??? WNL    []??? Abnormal:     Active Movements: []??? N/A   []? ?? Too acute   []? ?? Other:  ROM AROM Comments:pain, area   Forward flexion 40     Extension 50     SB right 40     SB left  35     Rotation right 60  left UT tension reported   Rotation left 75                                                                    AROM                     PROM                       MMT      Left Right Left Right Left Right   Shoulder Flexion 150 154     5/5 5/5     Abduction 160 150     5/5 5/5     Extension [facility]     5/5 5/5     IR T9 level L1 level     5/5 5/5     ER [facility]     5/5 5/5   Elbow Flexion [facility]     5/5 5/5     Extension [facility]     5/5 5/5     Prontation [facility]     5/5 4+/5     Supination [facility] WFL     4+/5 4+/5   Wrist  Flexion [facility]     5/ 4+/5     Extension [facility]     5/ 4+/5   - right wrist/forearm weakness related to history of wrist sprain     Muscle Flexibility:               Upper Trap:     []??? WNL    [x]? ?? Tight    []? ?? R    []??? L              Levator:           []??? WNL    [x]? ?? Tight    []? ?? R    []? ?? L              Pect. Minor:     []??? WNL    [x]? ?? Tight    []? ?? R    []? ?? L           Short Term Goals:  1. Pt will be independent with HEP to improve cervical and UE AROM, postural awareness and stabilization in 3 weeks. MET, 10/07/2021.  2. Pt will improve cervical AROM by atleast 5 degrees in all planes for improved ability to scan environment while driving in 3 weeks. MET, 10/07/2021. 3. Pt will report no greater than 6/10 pain in cervical region with all daily activity in 3 weeks. MET today, pt without pain today, pt reports max pain of  2/10 at this time.     Long Term Goals:  1. Pt will improve B shoulder flexion & abduction AROM to >140 degrees for improved overhead reach in 6 weeks. MET, 10/07/2021.   2. Pt will improve B shoulder strength by 1/2 MMT grade for improved ability to perform leisure activities in 6 weeks. MET, as listed above. 3. Pt will report no greater than 2/10 pain in cervical region with daily activity in 6 weeks. MET today, pt without pain today, pt reports max pain of  2/10 at this time. 4. Pt will improve NDI score to <20% for improved functional mobility & quality of life in 6 weeks. Met, 10/07/2021. New Goal (11/11/2021):  1. Pt will improve B cervical rotation AROM to at least 75 degrees for improved ability to scan environment while driving and performing duties at school and work in 4 weeks.          Assessment/ Patient Update: Pt is s/p posterior fossa craniectomy, C1 laminectomy for Chiari malformation decompression 6/18/2021 and has made measurable progress over past 10 weeks in outpatient PT in cervical and shoulder AROM, UE strength, postural awareness and function. Pt will benefit from continued skilled PT intervention to target deficits in effort to maximize pain-free daily activity, restore function and independence within home, classroom and community.     Problem List: pain affecting function, decrease ROM, decrease strength, decrease ADL/ functional abilitiies, decrease activity tolerance and decrease flexibility/ joint mobility     Updated Plan of Care:    Treatment Plan to include the following per provider discretion: Therapeutic exercise, Neuromuscular re-education, Physical agent/modality, Manual therapy, Patient education, Self Care training and Functional mobility training    Frequency / Duration:  Patient to be seen   2   times per week for   4  weeks        If you have any questions/comments please contact us directly at [phone]. Thank you for allowing us to assist in the care of your patient. Therapist Signature: Zaid Washington PT, DPT Date: 37/15/0254   Certification Period:  Reporting Period: 11/11/2021 - 01/31/2021 08/17/2021 - 11/11/2021 Time: 3:28 PM   NOTE TO PHYSICIAN:  PLEASE COMPLETE THE ORDERS BELOW AND FAX TO   [facility]: [phone]. If you are unable to process this request in 24 hours please contact our office: [phone].    ___ I have read the above report and request that my patient continue as recommended.   ___ I have read the above report and request that my patient continue therapy with the following changes/special instructions: ________________________________________________   ___ I have read the above report and request that my patient be discharged from therapy.      Physician Signature:        Date:       Time:

## 2021-11-11 NOTE — PROGRESS NOTES
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 Pain Level (0-10 scale): 0/10  Any medication changes, allergies to medications, adverse drug reactions, diagnosis change, or new procedure performed?: [x] No    [] Yes (see summary sheet for update)        OBJECTIVE    50 min Therapeutic Exercise:  [x] See flow sheet :   Rationale: increase ROM and increase strength to improve the patients ability to maximize pain-free daily activity, restore PLOF         With TE Patient Education: [x] Review HEP    [] Progressed/Changed HEP based on:   [] positioning   [x] body mechanics   [] transfers   [] heat/ice application          Pain Level (0-10 scale) post treatment: 0/10    ASSESSMENT/Changes in Function: Session initiated with cervical self-stretches to address rotation. Reassessment today to address cervical AROM, UE strength, endurance. Progression today to include UE PRE: hammer curls, shoulder press, shoulder shrugs, resisted forward/lateral raises. Pt cued for posture with all resisted tasks today. Plan to further address deficits in coming weeks to maximize functional return.      []  See Plan of Care  [x]  See progress note/recertification  []  See Discharge Summary             PLAN  []  Upgrade activities as tolerated     []  Continue plan of care  [x]  Update interventions per flow sheet       []  Discharge due to:_  []  Other:_      Turner Cavanaugh, PT, DPT 2021  3:27 PM

## 2021-11-16 ENCOUNTER — HOSPITAL ENCOUNTER (OUTPATIENT)
Dept: PHYSICAL THERAPY | Age: 31
Discharge: HOME | End: 2021-11-16
Payer: MEDICAID

## 2021-11-16 PROCEDURE — 97110 THERAPEUTIC EXERCISES: CPT

## 2021-11-16 NOTE — PROGRESS NOTES
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]    SUBJECTIVE  Pt reports difficulty with over head activity endurance  Pain Level (0-10 scale): 0/10  Any medication changes, allergies to medications, adverse drug reactions, diagnosis change, or new procedure performed?: [x] No    [] Yes (see summary sheet for update)    OBJECTIVE    42 min Therapeutic Exercise:  [x] See flow sheet :   Rationale: increase ROM and increase strength to improve the patients ability to maximize pain-free daily activity, restore PLOF         With TE Patient Education: [x] Review HEP    [] Progressed/Changed HEP based on:   [] positioning   [x] body mechanics   [] transfers   [] heat/ice application          Pain Level (0-10 scale) post treatment: 0/10    ASSESSMENT/Changes in Function: Session initiated with cervical self-stretches to address rotation. After this Pt performed UE straightening and endurance exercise. Cues provided to improve form and prevent compensation. Initiated revised POC and flow sheet exercise Pt with no adverse effects. Pt cued for posture with all resisted tasks today.  Will continue to address strength and UE funcinal endurance     []  See Plan of Care  [x]  See progress note/recertification  []  See Discharge Summary         PLAN  []  Upgrade activities as tolerated     [x]  Continue plan of care per updated flow   []  Discharge due to:_  []  Other:_      Luis Gould, PT, DPT 2021  3:27 PM

## 2021-11-18 ENCOUNTER — HOSPITAL ENCOUNTER (OUTPATIENT)
Dept: PHYSICAL THERAPY | Age: 31
Discharge: HOME | End: 2021-11-18
Payer: MEDICAID

## 2021-11-18 PROCEDURE — 97110 THERAPEUTIC EXERCISES: CPT

## 2021-11-18 NOTE — PROGRESS NOTES
PT DAILY TREATMENT NOTE 8-    Patient Name: Lin Apley  Date:2021  : 1990  [x]  Patient  Verified  Payor: BLUE CROSS MEDICAID / Plan: [facility] HEALTHKEEPERS PLUS / Product Type: Managed Care Medicaid /    In time:1524  Out time:1628  Total Treatment Time (min): 44  Total Timed Codes (min): 44  1:1 Treatment Time (min): 44   Visit #: 19    Treatment Area: Compression of brain [G93.5]    SUBJECTIVE  Pt reports soreness from previous PT session. Pain Level (0-10 scale): 0    Any medication changes, allergies to medications, adverse drug reactions, diagnosis change, or new procedure performed?: [x] No    [] Yes (see summary sheet for update)    OBJECTIVE    44 min Therapeutic Exercise:  [] See flow sheet :   Rationale: increase ROM, increase strength, improve coordination and increase proprioception to improve the patients ability to restore function and mobility allowing for ease with all activities. With TE  TA   NR  GT   Misc Patient Education: [x] Review HEP    [] Progressed/Changed HEP based on:   [] positioning   [] body mechanics   [] transfers   [] heat/ice application        Pain Level (0-10 scale) post treatment: 0    ASSESSMENT/Changes in Function: Session initiated with cervical self-stretches to address rotation. After this Pt performed UE straightening and endurance exercise. Cues provided to improve form and prevent compensation. Initiated revised POC and flow sheet exercise Pt with no adverse effects. Pt cued for posture with all resisted tasks today. Resistance increased with tricep kickbacks.  Will continue to address strength and UE funcinal endurance     Patient will continue to benefit from skilled PT services to modify and progress therapeutic interventions, address functional mobility deficits, address ROM deficits, address strength deficits, analyze and address soft tissue restrictions, analyze and cue movement patterns, analyze and modify body mechanics/ergonomics and assess and modify postural abnormalities to attain remaining goals.      [x]  See Plan of Care  []  See progress note/recertification  []  See Discharge Summary         PLAN  []  Upgrade activities as tolerated     [x]  Continue plan of care  []  Update interventions per flow sheet       []  Discharge due to:_  []  Other:_      VAUGHN Blair  11/18/2021  4:58 PM

## 2021-11-23 ENCOUNTER — HOSPITAL ENCOUNTER (OUTPATIENT)
Dept: PHYSICAL THERAPY | Age: 31
Discharge: HOME | End: 2021-11-23
Payer: MEDICAID

## 2021-11-23 PROCEDURE — 97110 THERAPEUTIC EXERCISES: CPT

## 2021-11-23 NOTE — PROGRESS NOTES
PT DAILY TREATMENT NOTE 8-14    Patient Name: Matthew Chang  Date:2021  : 1990  [x]  Patient  Verified  Payor: BLUE CROSS MEDICAID / Plan: [facility] HEALTHKEEPERS PLUS / Product Type: Managed Care Medicaid /    In time:1353  Out time:1442  Total Treatment Time (min): 51  Total Timed Codes (min): 51  1:1 Treatment Time (min): 51   Visit #: 7 of 18    Treatment Area: Compression of brain [G93.5]    SUBJECTIVE  Pt reported no pain in cervical area but was feeling under the weather at time of appointment. Pain Level (0-10 scale): 0/10    Any medication changes, allergies to medications, adverse drug reactions, diagnosis change, or new procedure performed?: [x] No    [] Yes (see summary sheet for update)        OBJECTIVE  Modality rationale: No modalities needed today   Min Type Additional Details    [] Estim: []Att   []Unatt  []TENS instruct                 []IFC  []Premod []NMES                       []Other:  []w/US   []w/ice   []w/heat  Position:  Location:    []  Traction: [] Cervical       []Lumbar                       [] Prone          []Supine                       []Intermittent   []Continuous Lbs:  [] before manual  [] after manual    []  Ultrasound: []Continuous   [] Pulsed                           []1MHz   []3MHz Location:  W/cm2:    []  Ice     []  heat  []  Ice massage Position:  Location:    []  Vasopneumatic Device Pressure: [] lo [] med [] hi   Temp: [] lo [] med [] hi   [] Skin assessment post-treatment:  []intact []redness- no adverse reaction       []redness  adverse reaction:       51 min Therapeutic Exercise:  [x] See flow sheet :   Rationale: increase ROM and increase strength to improve the patients ability to return to prior level of function before injury/illness with reduced pain, achieving optimal strength and function to perform household tasks, daily activities, and return to community events, and/or work.                 With ROCHELLE ELLSWORTH   NR  GT   Misc Patient Education: [x] Review HEP    [] Progressed/Changed HEP based on:   [] positioning   [] body mechanics   [] transfers   [] heat/ice application          Patient's response to today's treatment: Pt declined HP and initiated treatment per flow sheet with cervical rotation with towel, supine chin tucks, shoulder shrugs, seated OH shoulder press/hammer curls/ triceps PRE's with 3lb weights, then finished with thoracic stretch and AROM on foam roller. Regressed from 5lb weight due to increased discomfort reported at last visit. Pt tolerated all exercises well. Continue POC. Pain Level (0-10 scale) post treatment: 0/10    ASSESSMENT/Changes in Function: Continued with POC with exercises as noted per flow sheet. Pt able to tolerate today's session with minimal increase in pain, which resolved post exercise. Will cont to progress as able. Patient will continue to benefit from skilled PT services to modify and progress therapeutic interventions, address functional mobility deficits, address ROM deficits, address strength deficits and analyze and address soft tissue restrictions to attain remaining goals.      [x]  See Plan of Care  []  See progress note/recertification  []  See Discharge Summary           PLAN  []  Upgrade activities as tolerated     [x]  Continue plan of care  []  Update interventions per flow sheet       []  Discharge due to:_  []  Other:_      VAUGHN Salinas 11/23/2021  2:47 PM  FAUSTO Osman

## 2021-11-29 ENCOUNTER — HOSPITAL ENCOUNTER (OUTPATIENT)
Dept: PHYSICAL THERAPY | Age: 31
Discharge: HOME | End: 2021-11-29
Payer: MEDICAID

## 2021-11-29 PROCEDURE — 97110 THERAPEUTIC EXERCISES: CPT

## 2021-11-29 NOTE — PROGRESS NOTES
PT DAILY TREATMENT NOTE 8    Patient Name: Mich Menchaca  Date:2021  : 1990  [x]  Patient  Verified  Payor: BLUE CROSS MEDICAID / Plan: [facility] HEALTHKEEPERS PLUS / Product Type: Managed Care Medicaid /    In time:1305  Out time:1350  Total Treatment Time (min): 45  Total Timed Codes (min): 45  1:1 Treatment Time (min): 45   Visit #: 8 of 18    Treatment Area: Compression of brain [G93.5]    SUBJECTIVE  Pt reports soreness from cooking and lifting over the Thanksgiving holiday. Pain Level (0-10 scale): 0    Any medication changes, allergies to medications, adverse drug reactions, diagnosis change, or new procedure performed?: [x] No    [] Yes (see summary sheet for update)    OBJECTIVE  Modality rationale: Declined   Min Type Additional Details    [] Estim: []Att   []Unatt  []TENS instruct                 []IFC  []Premod []NMES                       []Other:  []w/US   []w/ice   []w/heat  Position:  Location:    []  Traction: [] Cervical       []Lumbar                       [] Prone          []Supine                       []Intermittent   []Continuous Lbs:  [] before manual  [] after manual    []  Ultrasound: []Continuous   [] Pulsed                           []1MHz   []3MHz Location:  W/cm2:    []  Ice     []  heat  []  Ice massage Position:  Location:    []  Vasopneumatic Device Pressure: [] lo [] med [] hi   Temp: [] lo [] med [] hi   [] Skin assessment post-treatment:  []intact []redness- no adverse reaction       []redness  adverse reaction:     45 min Therapeutic Exercise:  [] See flow sheet :   Rationale: increase ROM, increase strength, improve coordination and increase proprioception to improve the patients ability to achieve full AROM and strength while remaining pain free.            With TE  TA   NR  GT   Misc Patient Education: [x] Review HEP    [] Progressed/Changed HEP based on:   [] positioning   [] body mechanics   [] transfers   [] heat/ice application        Pain Level (0-10 scale) post treatment: 0    ASSESSMENT/Changes in Function: Exercises completed per flowsheet to patient's tolerance. Session began with AAROM to cervical region working to increase AROM. Continued POC to improve B shoulder flexion and abduction while remaining pain free. Pt shows a good understanding of all exercises and proper form throughout. Will continue POC and progress as able. Patient will continue to benefit from skilled PT services to modify and progress therapeutic interventions, address functional mobility deficits, address ROM deficits, address strength deficits, analyze and address soft tissue restrictions, analyze and cue movement patterns, analyze and modify body mechanics/ergonomics and assess and modify postural abnormalities to attain remaining goals.      [x]  See Plan of Care  []  See progress note/recertification  []  See Discharge Summary         PLAN  []  Upgrade activities as tolerated     [x]  Continue plan of care  []  Update interventions per flow sheet       []  Discharge due to:_  []  Other:_      VAUGHN Blair  11/29/2021  2:00 PM

## 2021-12-01 ENCOUNTER — HOSPITAL ENCOUNTER (OUTPATIENT)
Dept: PHYSICAL THERAPY | Age: 31
Discharge: HOME | End: 2021-12-01
Payer: MEDICAID

## 2021-12-01 PROCEDURE — 97110 THERAPEUTIC EXERCISES: CPT

## 2021-12-01 NOTE — PROGRESS NOTES
PT DAILY TREATMENT NOTE 8-14    Patient Name: Bobby Espinal  Date:2021  : 1990  [x]  Patient  Verified  Payor: BLUE CROSS MEDICAID / Plan: [facility] HEALTHKEEPERS PLUS / Product Type: Managed Care Medicaid /    In time:1444  Out time:1537  Total Treatment Time (min): 53  Total Timed Codes (min): 53  1:1 Treatment Time (min): 53   Visit # 24      Treatment Area: Compression of brain [G93.5]    SUBJECTIVE  Pt reports UT soreness upon arrival today related to lifting a case of water. Pain Level (0-10 scale): 0/10  Any medication changes, allergies to medications, adverse drug reactions, diagnosis change, or new procedure performed?: [x] No    [] Yes (see summary sheet for update)        OBJECTIVE    53 min Therapeutic Exercise:  [] See flow sheet :   Rationale: increase ROM, increase strength and improve coordination to improve the patients ability to maximize pain-free daily activity, encourage postural awareness and endurance with functional mobility       With TE Patient Education: [x] Review HEP    [] Progressed/Changed HEP based on:   [] positioning   [] body mechanics   [] transfers   [] heat/ice application          Pain Level (0-10 scale) post treatment: 0/10    ASSESSMENT/Changes in Function: Session initiated with seated cervical self-stretches followed by supine thoracic/pectoral static stretching and UE AROM with postural feedback on 1/2 foam roller. Resistance progressed with UE strengthening with supine DB chest press, seated OH shoulder press on [employer] machine & repetitions progressed with hammer curls and triceps extensions. Lat pulldowns introduced today in sitting to promote periscapular endurance & posture with functional movement. Pt provided with demonstration, tactile and/or verbal cues with activities. Farmer's carry introduced today to promote core stability and endurance with functional mobility such as carrying groceries and pet food.  Pt encouraged to ice at home today as she declined in clinic. Patient will continue to benefit from skilled PT services to modify and progress therapeutic interventions, address functional mobility deficits, address ROM deficits, address strength deficits, analyze and address soft tissue restrictions, analyze and cue movement patterns, analyze and modify body mechanics/ergonomics and assess and modify postural abnormalities to attain remaining goals.      [x]  See Plan of Care  []  See progress note/recertification  []  See Discharge Summary             PLAN  [x]  Upgrade activities as tolerated     [x]  Continue plan of care  []  Update interventions per flow sheet       []  Discharge due to:_  []  Other:_      Anabel Daniels, PT, DPT 12/1/2021  2:42 PM

## 2021-12-07 ENCOUNTER — HOSPITAL ENCOUNTER (OUTPATIENT)
Dept: PHYSICAL THERAPY | Age: 31
Discharge: HOME | End: 2021-12-07
Payer: MEDICAID

## 2021-12-07 PROCEDURE — 97110 THERAPEUTIC EXERCISES: CPT

## 2021-12-07 NOTE — PROGRESS NOTES
PT DAILY TREATMENT NOTE -    Patient Name: Sydnie Otoole  Date:2021  : 1990  [x]  Patient  Verified  Payor: BLUE CROSS MEDICAID / Plan: [facility] HEALTHKEEPERS PLUS / Product Type: Managed Care Medicaid /    In time:1443  Out time:1533  Total Treatment Time (min): 50  Total Timed Codes (min):50  1:1 Treatment Time (min): 50  Visit #: 10 of 18    Treatment Area: Compression of brain [G93.5]    SUBJECTIVE  Pt reported just soreness from new exercises. Pain Level (0-10 scale): 0    Any medication changes, allergies to medications, adverse drug reactions, diagnosis change, or new procedure performed?: [x] No    [] Yes (see summary sheet for update)        OBJECTIVE      50 min Therapeutic Exercise:  [x] See flow sheet :   Rationale: increase ROM, improve coordination, improve balance and increase proprioception to improve the patients ability to return to prior level of function before injury/illness with reduced pain, achieving optimal strength and function to perform household tasks, daily activities, and return to community events, and/or work. With TE  TA   NR  GT   Misc Patient Education: [x] Review HEP    [] Progressed/Changed HEP based on:   [] positioning   [x] body mechanics   [] transfers   [] heat/ice application          Patient's response to today's treatment: Began session with cervical rotations w towel, followed by 1/2 roll to promote spine extension/stretch. Performed resistive strengthening with bands and weights as noted per flow sheet. No increased pain, only soreness. Progress as able. Cont POC. Pain Level (0-10 scale) post treatment: 0    ASSESSMENT/Changes in Function: Continued with POC with exercises as noted per flow sheet. Pt able to tolerate today's session with minimal increase in pain, which resolved post exercise. Will cont to progress as able.       Patient will continue to benefit from skilled PT services to modify and progress therapeutic interventions, address functional mobility deficits, address ROM deficits, address strength deficits, analyze and cue movement patterns and analyze and modify body mechanics/ergonomics to attain remaining goals.      [x]  See Plan of Care  []  See progress note/recertification  []  See Discharge Summary           PLAN  []  Upgrade activities as tolerated     [x]  Continue plan of care  []  Update interventions per flow sheet       []  Discharge due to:_  []  Other:_      VAUGHN Bauer 12/7/2021  3:40 PM

## 2021-12-09 ENCOUNTER — APPOINTMENT (OUTPATIENT)
Dept: PHYSICAL THERAPY | Age: 31
End: 2021-12-09
Payer: MEDICAID

## 2021-12-13 ENCOUNTER — HOSPITAL ENCOUNTER (OUTPATIENT)
Dept: PHYSICAL THERAPY | Age: 31
Discharge: HOME | End: 2021-12-13
Payer: MEDICAID

## 2021-12-13 PROCEDURE — 97110 THERAPEUTIC EXERCISES: CPT

## 2021-12-13 NOTE — PROGRESS NOTES
PT DAILY TREATMENT NOTE     Patient Name: Eliezer Gregory  Date:2021  : 1990  [x]  Patient  Verified  Payor: BLUE CROSS MEDICAID / Plan: [facility] HEALTHKEEPERS PLUS / Product Type: Managed Care Medicaid /    In time:1515  Out time:1558  Total Treatment Time (min): 43  Total Timed Codes (min):43  1:1 Treatment Time (min): 43  Visit #: 11 of 18    Treatment Area: Compression of brain [G93.5]    SUBJECTIVE  Pt reported just soreness from new exercises. Pain Level (0-10 scale): 0    Any medication changes, allergies to medications, adverse drug reactions, diagnosis change, or new procedure performed?: [x] No    [] Yes (see summary sheet for update)        OBJECTIVE      43 min Therapeutic Exercise:  [x] See flow sheet :   Rationale: increase ROM, improve coordination, improve balance and increase proprioception to improve the patients ability to return to prior level of function before injury/illness with reduced pain, achieving optimal strength and function to perform household tasks, daily activities, and return to community events, and/or work. With TE  TA   NR  GT   Misc Patient Education: [x] Review HEP    [] Progressed/Changed HEP based on:   [] positioning   [x] body mechanics   [] transfers   [] heat/ice application          Patient's response to today's treatment: Began session with cervical rotations w towel, followed by 1/2 roll to promote spine extension/stretch. Performed resistive strengthening with bands and weights as noted per flow sheet. No increased pain, only soreness. Progress as able. Cont POC. Pain Level (0-10 scale) post treatment: 0    ASSESSMENT/Changes in Function: Continued with POC with exercises as noted per flow sheet. Pt able to tolerate today's session with minimal increase in pain, which resolved post exercise. Will cont to progress as able.       Patient will continue to benefit from skilled PT services to modify and progress therapeutic interventions, address functional mobility deficits, address ROM deficits, address strength deficits, analyze and cue movement patterns and analyze and modify body mechanics/ergonomics to attain remaining goals.      [x]  See Plan of Care  []  See progress note/recertification  []  See Discharge Summary           PLAN  []  Upgrade activities as tolerated     [x]  Continue plan of care  []  Update interventions per flow sheet       []  Discharge due to:_  []  Other:_      VAUGHN Shaffer 12/13/2021  3:40 PM

## 2021-12-15 ENCOUNTER — HOSPITAL ENCOUNTER (OUTPATIENT)
Dept: PHYSICAL THERAPY | Age: 31
Discharge: HOME | End: 2021-12-15
Payer: MEDICAID

## 2021-12-15 PROCEDURE — 97110 THERAPEUTIC EXERCISES: CPT

## 2021-12-15 NOTE — PROGRESS NOTES
PT DAILY TREATMENT NOTE -    Patient Name: Jaime Resendez  Date:12/15/2021  : 1990  [x]  Patient  Verified  Payor: BLUE CROSS MEDICAID / Plan: [facility] [employer]KEEPERS PLUS / Product Type: Managed Care Medicaid /    In time:1436  Out time:1529  Total Treatment Time (min): 53  Total Timed Codes (min):53  1:1 Treatment Time (min): 53  Visit #: 12 of 18    Treatment Area: Compression of brain [G93.5]    SUBJECTIVE  Pt reported just soreness. Pain Level (0-10 scale): 0    Any medication changes, allergies to medications, adverse drug reactions, diagnosis change, or new procedure performed?: [x] No    [] Yes (see summary sheet for update)        OBJECTIVE      53 min Therapeutic Exercise:  [x] See flow sheet :   Rationale: increase ROM, improve coordination, improve balance and increase proprioception to improve the patients ability to return to prior level of function before injury/illness with reduced pain, achieving optimal strength and function to perform household tasks, daily activities, and return to community events, and/or work. With TE  TA   NR  GT   Misc Patient Education: [x] Review HEP    [] Progressed/Changed HEP based on:   [] positioning   [x] body mechanics   [] transfers   [] heat/ice application          Patient's response to today's treatment: Began session with cervical rotations w towel, followed by 1/2 roll to promote spine extension/stretch. Performed resistive strengthening with bands seated on swiss ball, and weights as noted per flow sheet. No increased pain, only soreness. Progress as able. Cont POC. Pain Level (0-10 scale) post treatment: 0    ASSESSMENT/Changes in Function: Continued with POC with exercises as noted per flow sheet. Pt able to tolerate today's session with minimal increase in pain, which resolved post exercise. Will cont to progress as able.       Patient will continue to benefit from skilled PT services to modify and progress therapeutic interventions, address functional mobility deficits, address ROM deficits, address strength deficits, analyze and cue movement patterns and analyze and modify body mechanics/ergonomics to attain remaining goals.      [x]  See Plan of Care  []  See progress note/recertification  []  See Discharge Summary           PLAN  []  Upgrade activities as tolerated     [x]  Continue plan of care  []  Update interventions per flow sheet       []  Discharge due to:_  []  Other:_      VAUGHN Dillon 12/15/2021  3:40 PM

## 2021-12-22 ENCOUNTER — HOSPITAL ENCOUNTER (OUTPATIENT)
Dept: PHYSICAL THERAPY | Age: 31
Discharge: HOME | End: 2021-12-22
Payer: MEDICAID

## 2021-12-22 PROCEDURE — 97110 THERAPEUTIC EXERCISES: CPT

## 2021-12-22 NOTE — PROGRESS NOTES
PT DAILY TREATMENT NOTE 8-    Patient Name: Meghana Messing  Date:2021  : 1990  [x]  Patient  Verified  Payor: BLUE CROSS MEDICAID / Plan: [facility] [employer] HEALTHKEEPERS PLUS / Product Type: Managed Care Medicaid /    In time:1351  Out time:1429  Total Treatment Time (min): 38  Total Timed Codes (min):38  1:1 Treatment Time (min): 38  Visit #: 13 of 18    Treatment Area: Compression of brain [G93.5]    SUBJECTIVE  Pt reported just soreness. Pain Level (0-10 scale): 0    Any medication changes, allergies to medications, adverse drug reactions, diagnosis change, or new procedure performed?: [x] No    [] Yes (see summary sheet for update)        OBJECTIVE      38 min Therapeutic Exercise:  [x] See flow sheet :   Rationale: increase ROM, improve coordination, improve balance and increase proprioception to improve the patients ability to return to prior level of function before injury/illness with reduced pain, achieving optimal strength and function to perform household tasks, daily activities, and return to community events, and/or work. With TE  TA   NR  GT   Misc Patient Education: [x] Review HEP    [] Progressed/Changed HEP based on:   [] positioning   [x] body mechanics   [] transfers   [] heat/ice application          Patient's response to today's treatment: Began session with cervical rotations w towel, followed by 1/2 roll to promote spine extension/stretch. Performed resistive strengthening with bands seated on swiss ball, and weights as noted per flow sheet. No increased pain, only soreness. Progress as able. Cont POC. Pain Level (0-10 scale) post treatment: 0    ASSESSMENT/Changes in Function: Continued with POC with exercises as noted per flow sheet. Pt able to tolerate today's session with minimal increase in pain, which resolved post exercise. Will cont to progress as able.       Patient will continue to benefit from skilled PT services to modify and progress therapeutic interventions, address functional mobility deficits, address ROM deficits, address strength deficits, analyze and cue movement patterns and analyze and modify body mechanics/ergonomics to attain remaining goals.      [x]  See Plan of Care  []  See progress note/recertification  []  See Discharge Summary           PLAN  []  Upgrade activities as tolerated     [x]  Continue plan of care  []  Update interventions per flow sheet       []  Discharge due to:_  []  Other:_      VAUGHN Quintero 12/22/2021  3:40 PM

## 2021-12-30 ENCOUNTER — HOSPITAL ENCOUNTER (OUTPATIENT)
Dept: PHYSICAL THERAPY | Age: 31
Discharge: HOME | End: 2021-12-30
Payer: MEDICAID

## 2021-12-30 PROCEDURE — 97110 THERAPEUTIC EXERCISES: CPT

## 2021-12-30 NOTE — PROGRESS NOTES
PT DAILY TREATMENT NOTE 8-    Patient Name: Donal Olivia  Date:2021  : 1990  [x]  Patient  Verified  Payor: BLUE CROSS MEDICAID / Plan: [facility] HEALTHKEEPERS PLUS / Product Type: Managed Care Medicaid /    In time:1347  Out time:1451  Total Treatment Time (min): 64  Total Timed Codes (min):64  1:1 Treatment Time (min): 64  Visit #: 14 of 18 (auth expires today)    Treatment Area: Compression of brain [G93.5]    SUBJECTIVE  Pt reported just soreness. Pain Level (0-10 scale): 0    Any medication changes, allergies to medications, adverse drug reactions, diagnosis change, or new procedure performed?: [x] No    [] Yes (see summary sheet for update)        OBJECTIVE      64 min Therapeutic Exercise:  [x] See flow sheet :   Rationale: increase ROM, improve coordination, improve balance and increase proprioception to improve the patients ability to return to prior level of function before injury/illness with reduced pain, achieving optimal strength and function to perform household tasks, daily activities, and return to community events, and/or work. With TE  TA   NR  GT   Misc Patient Education: [x] Review HEP    [] Progressed/Changed HEP based on:   [] positioning   [x] body mechanics   [] transfers   [] heat/ice application          Patient's response to today's treatment: Began session with cervical rotations w towel, followed by 1/2 roll to promote spine extension/stretch. Performed resistive strengthening with bands seated on swiss ball, and weights as noted per flow sheet. No increased pain, only soreness. Today pt auth runs out for [employer]ists. PT feels that she is ready for DC @ this time, however advised that chart would be open for about 2 weeks to determine further need for services, as which time we will DC if have not heard from patient. Will cont POC on that premise.      Pain Level (0-10 scale) post treatment: 0    ASSESSMENT/Changes in Function: Continued with POC with exercises as noted per flow sheet. Pt able to tolerate today's session with minimal increase in pain, which resolved post exercise. Will cont to progress as able. Patient will continue to benefit from skilled PT services to modify and progress therapeutic interventions, address functional mobility deficits, address ROM deficits, address strength deficits, analyze and cue movement patterns and analyze and modify body mechanics/ergonomics to attain remaining goals.      [x]  See Plan of Care  []  See progress note/recertification  []  See Discharge Summary           PLAN  []  Upgrade activities as tolerated     [x]  Continue plan of care  []  Update interventions per flow sheet       []  Discharge due to:_  []  Other:_      VAUGHN Hawkins 12/30/2021  3:40 PM

## 2022-01-04 ENCOUNTER — APPOINTMENT (OUTPATIENT)
Dept: PHYSICAL THERAPY | Age: 32
End: 2022-01-04

## 2022-01-14 NOTE — PROGRESS NOTES
[address] [place] [address] PHYSICAL THERAPY  [address] [place] [address] Dr MCKEON, [address] [place] [address], [facility] * Phone: [phone] * Fax: [phone]  DISCHARGE SUMMARY FOR PHYSICAL THERAPY            Patient Name: Koffi Esteban : 1990   Treatment/Medical Diagnosis: Compression of brain [G93.5]   Onset Date: 21    Referral Source: Nimisha Marroquin MD Start of Care [facility]): 21   Prior Hospitalization: See Medical History Provider #: [id number]   Prior Level of Function: Independent   Comorbidities: Arthritis, Depression, GERD   Medications: Verified on Patient Summary List   Visits from [facility]: 29 Missed Visits: 0     Objective:  Palpation: incision healing appropriatly, palpatory tenderness most noted along CTJ, cervical parapsinals      Posture:   Head Position: forward  Shoulder/Scapular Position: rounded  C-Lordosis:      [x]???? increased   []???? decreased  T-Kyphosis:     [x]???? increased   []???? decreased     Shoulder/Scapular Screen: []???? WNL    []???? Abnormal:     Active Movements: []???? N/A   []???? Too acute   []???? Other:  ROM AROM Comments:pain, area   Forward flexion 40     Extension 50     SB right 40     SB left  35     Rotation right 60  left UT tension reported   Rotation left 75                                                                    AROM                     PROM                       MMT      Left Right Left Right Left Right   Shoulder Flexion 150 154     5/5 5/5     Abduction 160 150     5/5 5/5     Extension [facility]     5/ 5/5     IR T9 level L1 level     5/ 5/5     ER [facility]     5 5/5   Elbow Flexion [facility]     5/ 5/5     Extension [facility]     5 5/5     Prontation [facility]     5 4+/5     Supination [facility]     4+/5 4+/5   Wrist  Flexion [facility]     5/5 4+/5     Extension [facility]     5/5 4+/5   - right wrist/forearm weakness related to history of wrist sprain     Muscle Flexibility:               Upper Trap:     []???? WNL    [x]? ??? Tight    []???? R    []???? L              Levator:           []???? WNL    [x]? ??? Tight    []???? R    []???? L              Pect. Minor:     []???? WNL    [x]? ??? Tight    []???? R    []???? L           Short Term Goals:  1. Pt will be independent with HEP to improve cervical and UE AROM, postural awareness and stabilization in 3 weeks. MET, 10/07/2021.  2. Pt will improve cervical AROM by atleast 5 degrees in all planes for improved ability to scan environment while driving in 3 weeks. MET, 10/07/2021. 3. Pt will report no greater than 6/10 pain in cervical region with all daily activity in 3 weeks.  MET 11/11/21.     Long Term Goals:  1. Pt will improve B shoulder flexion & abduction AROM to >140 degrees for improved overhead reach in 6 weeks. MET, 10/07/2021.   2. Pt will improve B shoulder strength by 1/2 MMT grade for improved ability to perform leisure activities in 6 weeks. MET, 11/11/21.   3. Pt will report no greater than 2/10 pain in cervical region with daily activity in 6 weeks.  MET 11/11/21. 4. Pt will improve NDI score to <20% for improved functional mobility & quality of life in 6 weeks. Met, 10/07/2021.      New Goal (11/11/2021):  1. Pt will improve B cervical rotation AROM to at least 75 degrees for improved ability to scan environment while driving and performing duties at school and work in 4 weeks. Progress measured.      Assessments/Recommendations: Discontinue therapy. Progressing towards or have reached established goals. If you have any questions/comments please contact us directly at [phone]. Thank you for allowing us to assist in the care of your patient. Therapist Signature: Ashley Cano PT, DPT Date: 1/14/2022   Reporting Period: 08/17/21 - 12/30/21 Time: 6:24 AM      Certification Period: 08/17/21 - 01/31/22       NOTE TO PHYSICIAN:  PLEASE COMPLETE THE ORDERS BELOW AND FAX TO   [facility]: [phone].   If you are unable to process this request in 24 hours please contact our office: [phone].    ___ I have read the above report and request that my patient be discharged from therapy.      Physician Signature:        Date:       Time:

## 2022-10-19 ENCOUNTER — HOSPITAL ENCOUNTER (OUTPATIENT)
Dept: PHYSICAL THERAPY | Age: 32
Discharge: HOME | End: 2022-10-19
Payer: MEDICAID

## 2022-10-19 PROCEDURE — 97162 PT EVAL MOD COMPLEX 30 MIN: CPT

## 2022-10-19 PROCEDURE — 97530 THERAPEUTIC ACTIVITIES: CPT
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
## 2022-10-25 ENCOUNTER — HOSPITAL ENCOUNTER (OUTPATIENT)
Dept: PHYSICAL THERAPY | Age: 32
Discharge: HOME | End: 2022-10-25
Payer: MEDICAID

## 2022-10-25 PROCEDURE — 97110 THERAPEUTIC EXERCISES: CPT

## 2022-10-25 PROCEDURE — 97016 VASOPNEUMATIC DEVICE THERAPY: CPT

## 2022-10-25 PROCEDURE — 97530 THERAPEUTIC ACTIVITIES: CPT

## 2022-10-25 NOTE — PROGRESS NOTES
PT DAILY TREATMENT NOTE 8    Patient Name: Raymond Naik  Date:10/25/2022  : 1990  [x]  Patient  Verified  Payor: BLUE CROSS MEDICAID / Plan: [facility] HEALTHKEEPERS PLUS / Product Type: Managed Care Medicaid /    In time:   Out time:111  Total Treatment Time (min): 68  Total Timed Codes (min): 48  1:1 Treatment Time (min): 68   Visit # 2      Treatment Area: Pain in right knee [M25.561]    SUBJECTIVE  Pt received in waiting area ambulating with no AD and an antalgic gait pattern. She reports increased muscle soreness in the RLE and states daily compliance with HEP.   Pain Level (0-10 scale): 7/10  Any medication changes, allergies to medications, adverse drug reactions, diagnosis change, or new procedure performed?: [x] No    [] Yes (see summary sheet for update)        OBJECTIVE  Modality rationale: decrease edema, decrease inflammation, decrease pain, and increase tissue extensibility to improve the patients ability to ambulate   Min Type Additional Details    [] Estim: []Att   []Unatt  []TENS instruct                 []IFC  []Premod []NMES                       []Other:  []w/US   []w/ice   []w/heat  Position:  Location:    []  Traction: [] Cervical       []Lumbar                       [] Prone          []Supine                       []Intermittent   []Continuous Lbs:  [] before manual  [] after manual    []  Ultrasound: []Continuous   [] Pulsed                           []1MHz   []3MHz Location:  W/cm2:        10 []  Ice     [x]  heat  []  Ice massage Position: seated  Location: R knee   10 [x]  Vasopneumatic Device Pressure: [x] lo [] med [] hi   Temp: [x] lo [] med [] hi   [x] Skin assessment post-treatment:  [x]intact []redness- no adverse reaction       []redness - adverse reaction:       31 min Therapeutic Exercise:  [x] See flow sheet :   Rationale: increase ROM and increase strength to improve the patients ability to ambulate    17 min Therapeutic Activity:  [x]  See flow sheet : Rationale: increase coordination to improve pt ability to transfer        With TE Patient Education: [x] Review HEP    [] Progressed/Changed HEP based on:   [] positioning   [] body mechanics   [] transfers   [] heat/ice application          Pain Level (0-10 scale) post treatment: 3/10    ASSESSMENT/Changes in Function:   Session initiated with MHP due to pt report of increased pain. Today's Tx session emphasized exercises per POC to increase R knee ROM and strength with Pt tolerating Tx with no adverse effects. Pt demonstrates good recall when performing exercises during today's Tx session and reports she is able to bend her knee more. Introduced SLR and clams and pt tolerates well. PT will continue per POC, progressing as tolerated. Patient will continue to benefit from skilled PT services to modify and progress therapeutic interventions, address functional mobility deficits, address ROM deficits, address strength deficits, analyze and address soft tissue restrictions, analyze and cue movement patterns, analyze and modify body mechanics/ergonomics, assess and modify postural abnormalities, and address imbalance/dizziness to attain remaining goals.      []  See Plan of Care  []  See progress note/recertification  []  See Discharge Summary             PLAN  [x]  Upgrade activities as tolerated     [x]  Continue plan of care  [x]  Update interventions per flow sheet       []  Discharge due to:_  []  Other:_      Veda Jessica PT, DPT 10/25/2022  10:50 AM

## 2022-10-27 ENCOUNTER — HOSPITAL ENCOUNTER (OUTPATIENT)
Dept: PHYSICAL THERAPY | Age: 32
Discharge: HOME | End: 2022-10-27
Payer: MEDICAID

## 2022-10-27 PROCEDURE — 97016 VASOPNEUMATIC DEVICE THERAPY: CPT

## 2022-10-27 PROCEDURE — 97110 THERAPEUTIC EXERCISES: CPT

## 2022-10-27 NOTE — PROGRESS NOTES
PT DAILY TREATMENT NOTE 8    Patient Name: Subhash Sandoval  Date:10/27/2022  : 1990  [x]  Patient  Verified  Payor: BLUE CROSS MEDICAID / Plan: [facility] HEALTHKEEPERS PLUS / Product Type: Managed Care Medicaid /    In time:1008  Out time:1110  Total Treatment Time (min): 62  Total Timed Codes (min): 52  1:1 Treatment Time (min): 52   Visit #: 3     Treatment Area: Pain in right knee [M25.561]    SUBJECTIVE  Pt reports stiffness today. Pain Level (0-10 scale): 5/10    Any medication changes, allergies to medications, adverse drug reactions, diagnosis change, or new procedure performed?: [x] No    [] Yes (see summary sheet for update)        OBJECTIVE  Modality rationale: decrease inflammation and decrease pain to improve the patients ability to CP/Vaso post session to decrease pain and edema from exercises.       Min Type Additional Details    [] Estim: []Att   []Unatt  []TENS instruct                 []IFC  []Premod []NMES                       []Other:  []w/US   []w/ice   []w/heat  Position:  Location:    []  Traction: [] Cervical       []Lumbar                       [] Prone          []Supine                       []Intermittent   []Continuous Lbs:  [] before manual  [] after manual    []  Ultrasound: []Continuous   [] Pulsed                           []1MHz   []3MHz Location:  W/cm2:    []  Iontophoresis with dexamethasone         Location: [] Take home patch   [] In clinic    []  Ice     []  heat  []  Ice massage Position:  Location:   10 [x]  Vasopneumatic Device Pressure: [x] lo [] med [] hi   Temp: [] lo [] med [] hi   [] Skin assessment post-treatment:  []intact []redness- no adverse reaction       []redness - adverse reaction:           52 min Therapeutic Exercise:  [x] See flow sheet :   Rationale: increase ROM, increase strength, improve coordination, and improve balance to improve the patients ability to return to prior level of function before injury/illness with reduced pain, achieving optimal strength and function to perform household tasks, daily activities, and return to community events, and/or work. With TE  TA   NR  GT   [facility] Patient Education: [x] Review HEP    [] Progressed/Changed HEP based on:   [] positioning   [] body mechanics   [] transfers   [] heat/ice application          Pain Level (0-10 scale) post treatment: 3/10    ASSESSMENT/Changes in Function: Began session today with warm up on stepper, followed by exercises as noted on flow sheet. Pt demonstrates deficits with flexion and extension. Expressed importance of getting ROM back to resume normal activity. Pt had no increase in pain today, but is sore. Vaso post session to decrease soreness and inflammation. Cont POC. Patient will continue to benefit from skilled PT services to modify and progress therapeutic interventions, address functional mobility deficits, address ROM deficits, and analyze and modify body mechanics/ergonomics to attain remaining goals.      [x]  See Plan of Care  []  See progress note/recertification  []  See Discharge Summary           PLAN  [x]  Upgrade activities as tolerated     [x]  Continue plan of care  []  Update interventions per flow sheet       []  Discharge due to:_  []  Other:_      Luisa Duran PTA, LPTA 10/27/2022  12:20 PM

## 2022-11-01 ENCOUNTER — APPOINTMENT (OUTPATIENT)
Dept: PHYSICAL THERAPY | Age: 32
End: 2022-11-01
Payer: MEDICAID

## 2022-11-03 ENCOUNTER — APPOINTMENT (OUTPATIENT)
Dept: PHYSICAL THERAPY | Age: 32
End: 2022-11-03
Payer: MEDICAID

## 2022-11-07 ENCOUNTER — HOSPITAL ENCOUNTER (OUTPATIENT)
Dept: PHYSICAL THERAPY | Age: 32
Discharge: HOME | End: 2022-11-07
Payer: MEDICAID

## 2022-11-07 PROCEDURE — 97110 THERAPEUTIC EXERCISES: CPT

## 2022-11-07 PROCEDURE — 97016 VASOPNEUMATIC DEVICE THERAPY: CPT

## 2022-11-07 NOTE — PROGRESS NOTES
PT DAILY TREATMENT NOTE     Patient Name: Darci Ross  Date:2022  : 1990  [x]  Patient  Verified  Payor: BLUE CROSS MEDICAID / Plan: [facility] HEALTHKEEPERS PLUS / Product Type: Managed Care Medicaid /    In time:1004 Out time: 1109  Total Treatment Time (min): 65  Total Timed Codes (min): 45  1:1 Treatment Time (min): 45  Visit #: 4     Treatment Area: Pain in right knee [M25.561]    SUBJECTIVE  Pt reports stiffness and pain today. Demonstrates antalgic gait pattern on arrival this morning. Pain Level (0-10 scale): 910    Any medication changes, allergies to medications, adverse drug reactions, diagnosis change, or new procedure performed?: [x] No    [] Yes (see summary sheet for update)        OBJECTIVE  Modality rationale: decrease inflammation and decrease pain to improve the patients ability to CP/Vaso post session to decrease pain and edema from exercises.       Min Type Additional Details    [] Estim: []Att   []Unatt  []TENS instruct                 []IFC  []Premod []NMES                       []Other:  []w/US   []w/ice   []w/heat  Position:  Location:    []  Traction: [] Cervical       []Lumbar                       [] Prone          []Supine                       []Intermittent   []Continuous Lbs:  [] before manual  [] after manual    []  Ultrasound: []Continuous   [] Pulsed                           []1MHz   []3MHz Location:  W/cm2:    []  Iontophoresis with dexamethasone         Location: [] Take home patch   [] In clinic   10 []  Ice     [x]  heat  []  Ice massage Position: longsitting  Location:R knee in extension   10 [x]  Vasopneumatic Device Pressure: [] lo [] med [] hi   Temp: [] lo [] med [] hi   [] Skin assessment post-treatment:  []intact []redness- no adverse reaction       []redness - adverse reaction:           45 min Therapeutic Exercise:  [x] See flow sheet :   Rationale: increase ROM, increase strength, improve coordination, and improve balance to improve the patients ability to return to prior level of function before injury/illness with reduced pain, achieving optimal strength and function to perform household tasks, daily activities, and return to community events, and/or work. With TE  SENG   NR  GT   [facility] Patient Education: [x] Review HEP    [] Progressed/Changed HEP based on:   [] positioning   [] body mechanics   [] transfers   [] heat/ice application          Pain Level (0-10 scale) post treatment: 3/10    ASSESSMENT/Changes in Function: Began session today with warm up on stepper, followed by exercises as noted on flow sheet. Pt demonstrates deficits with flexion and extension. Expressed importance of getting ROM back to resume normal activity. Pt had no increase in pain today, but is sore. Vaso post session to decrease soreness and inflammation. Pt had no c/o pain post session as she stated she was \"frozen\". Cont POC. Patient will continue to benefit from skilled PT services to modify and progress therapeutic interventions, address functional mobility deficits, address ROM deficits, and analyze and modify body mechanics/ergonomics to attain remaining goals.      [x]  See Plan of Care  []  See progress note/recertification  []  See Discharge Summary           PLAN  [x]  Upgrade activities as tolerated     [x]  Continue plan of care  []  Update interventions per flow sheet       []  Discharge due to:_  []  Other:_      Shannan Pruett PTA, LPTA 11/7/2022  12:20 PM

## 2022-11-11 ENCOUNTER — HOSPITAL ENCOUNTER (OUTPATIENT)
Dept: PHYSICAL THERAPY | Age: 32
Discharge: HOME | End: 2022-11-11
Payer: MEDICAID

## 2022-11-11 PROCEDURE — 97110 THERAPEUTIC EXERCISES: CPT

## 2022-11-11 NOTE — PROGRESS NOTES
PT DAILY TREATMENT NOTE 8    Patient Name: Claudio Seaman  Date:2022  : 1990  [x]  Patient  Verified  Payor: BLUE CROSS MEDICAID / Plan: [address] [place] [address] / Product Type: Managed Care Medicaid /    In time:1103  Out time:1158  Total Treatment Time (min): 55  Total Timed Codes (min): 55  1:1 Treatment Time (min): 55   Visit #: 5     Treatment Area: Pain in right knee [M25.561]    SUBJECTIVE  Pt reports a follow up appointment with referring doctor where she was told to continue physical therapy and take anti-inflammatory. She reports soreness from range of motion testing. Pain Level (0-10 scale): 7    Any medication changes, allergies to medications, adverse drug reactions, diagnosis change, or new procedure performed?: [x] No    [] Yes (see summary sheet for update)    OBJECTIVE  Modality rationale: Declined   Min Type Additional Details    [] Estim: []Att   []Unatt  []TENS instruct                 []IFC  []Premod []NMES                       []Other:  []w/US   []w/ice   []w/heat  Position:  Location:    []  Traction: [] Cervical       []Lumbar                       [] Prone          []Supine                       []Intermittent   []Continuous Lbs:  [] before manual  [] after manual    []  Ultrasound: []Continuous   [] Pulsed                           []1MHz   []3MHz Location:  W/cm2:    []  Ice     []  heat  []  Ice massage Position:  Location:    []  Vasopneumatic Device Pressure: [] lo [] med [] hi   Temp: [] lo [] med [] hi   [] Skin assessment post-treatment:  []intact []redness- no adverse reaction       []redness - adverse reaction:     55 min Therapeutic Exercise:  [x] See flow sheet :   Rationale: increase ROM, increase strength, improve coordination, improve balance, and increase proprioception to improve the patients ability to complete work and household activities pain free.       With TE  TA   NR  GT   Misc Patient Education: [x] Review HEP    [] Progressed/Changed HEP based on:   [] positioning   [] body mechanics   [] transfers   [] heat/ice application        Pain Level (0-10 scale) post treatment: 2    ASSESSMENT/Changes in Function: Session began hamstring and gastroc stretches to decrease tightness and soreness. Continued with exercises per flowsheet to decrease range of motion and strength deficits in R knee. Repetitions increase with supine clams with no adverse effects. Introduced bridges to improve LE stability and posterior chain strengthening. Will continue POC progressing to patient's tolerance. Patient will continue to benefit from skilled PT services to modify and progress therapeutic interventions, address functional mobility deficits, address ROM deficits, address strength deficits, analyze and address soft tissue restrictions, analyze and cue movement patterns, analyze and modify body mechanics/ergonomics, assess and modify postural abnormalities, and address imbalance/dizziness to attain remaining goals.      [x]  See Plan of Care  []  See progress note/recertification  []  See Discharge Summary         PLAN  []  Upgrade activities as tolerated     [x]  Continue plan of care  []  Update interventions per flow sheet       []  Discharge due to:_  []  Other:    Michelle Salazar  11/11/2022  11:15 AM

## 2022-11-15 ENCOUNTER — HOSPITAL ENCOUNTER (OUTPATIENT)
Dept: PHYSICAL THERAPY | Age: 32
Discharge: HOME | End: 2022-11-15
Payer: MEDICAID

## 2022-11-15 PROCEDURE — 97016 VASOPNEUMATIC DEVICE THERAPY: CPT

## 2022-11-15 PROCEDURE — 97110 THERAPEUTIC EXERCISES: CPT

## 2022-11-15 NOTE — PROGRESS NOTES
PT DAILY TREATMENT NOTE     Patient Name: [place]  Date:11/15/2022  : 1990  [x]  Patient  Verified  Payor: BLUE CROSS MEDICAID / Plan: [facility] [employer] HEALTHKEEPERS PLUS / Product Type: Managed Care Medicaid /    In time:1000  Out time:1056  Total Treatment Time (min): 56  Total Timed Codes (min): 36  1:1 Treatment Time (min): 35   Visit #: 6     Treatment Area: Pain in right knee [M25.561]    SUBJECTIVE  Pt reports soreness in R knee and pain from walking and stepping over objects at work. Pain Level (0-10 scale): 5    Any medication changes, allergies to medications, adverse drug reactions, diagnosis change, or new procedure performed?: [x] No    [] Yes (see summary sheet for update)    OBJECTIVE  Modality rationale: decrease edema, decrease inflammation, decrease pain, and increase tissue extensibility to improve the patients ability to fully participate in rehab and and post rehab.    Min Type Additional Details    [] Estim: []Att   []Unatt  []TENS instruct                 []IFC  []Premod []NMES                       []Other:  []w/US   []w/ice   []w/heat  Position:  Location:    []  Traction: [] Cervical       []Lumbar                       [] Prone          []Supine                       []Intermittent   []Continuous Lbs:  [] before manual  [] after manual    []  Ultrasound: []Continuous   [] Pulsed                           []1MHz   []3MHz Location:  W/cm2:   10 []  Ice     [x]  heat  []  Ice massage Position: seated with R LE propped  Location: R knee   10 [x]  Vasopneumatic Device Pressure: [] lo [] med [] hi   Temp: [] lo [] med [] hi   [] Skin assessment post-treatment:  []intact []redness- no adverse reaction       []redness - adverse reaction:     35 min Therapeutic Exercise:  [x] See flow sheet :   Rationale: increase ROM, increase strength, improve coordination, improve balance, and increase proprioception to improve the patients ability to restore functiona and mobility allowing for ease with daily activities. With TE  TA   NR  GT   [facility] Patient Education: [x] Review HEP    [] Progressed/Changed HEP based on:   [] positioning   [] body mechanics   [] transfers   [] heat/ice application        Pain Level (0-10 scale) post treatment: 0    ASSESSMENT/Changes in Function:  Session began hamstring and gastroc stretches to decrease tightness and soreness. Continued with exercises per flowsheet to decrease range of motion and strength deficits in R knee. Repetitions increase with supine clams with no adverse effects. Introduced step ups to improve LE stability and muscular endurance. Resistance added to LAQ and [address] with no adverse effects. Vaso post rehab to combat soreness. Will continue POC progressing to patient's tolerance. Patient will continue to benefit from skilled PT services to modify and progress therapeutic interventions, address functional mobility deficits, address ROM deficits, address strength deficits, analyze and address soft tissue restrictions, analyze and cue movement patterns, analyze and modify body mechanics/ergonomics, assess and modify postural abnormalities, and address imbalance/dizziness to attain remaining goals.      [x]  See Plan of Care  []  See progress note/recertification  []  See Discharge Summary         PLAN  []  Upgrade activities as tolerated     [x]  Continue plan of care  []  Update interventions per flow sheet       []  Discharge due to:_  []  Other:    VAUGHN Montes De Oca  11/15/2022  1:48 PM

## 2022-11-17 ENCOUNTER — HOSPITAL ENCOUNTER (OUTPATIENT)
Dept: PHYSICAL THERAPY | Age: 32
Discharge: HOME | End: 2022-11-17
Payer: MEDICAID

## 2022-11-17 PROCEDURE — 97110 THERAPEUTIC EXERCISES: CPT

## 2022-11-17 NOTE — PROGRESS NOTES
PT DAILY TREATMENT NOTE 8-    Patient Name: Laure Pack  Date:2022  : 1990  [x]  Patient  Verified  Payor: BLUE CROSS MEDICAID / Plan: [facility] [employer] / Product Type: Managed Care Medicaid /    In time:1000  Out time: 1100  Total Treatment Time (min): 60  Total Timed Codes (min): 50  1:1 Treatment Time (min): 50   Visit #: 7     Treatment Area: Pain in right knee [M25.561]    SUBJECTIVE  Pt reports soreness in R knee and pain. No other complaints. Pain Level (0-10 scale): 5    Any medication changes, allergies to medications, adverse drug reactions, diagnosis change, or new procedure performed?: [x] No    [] Yes (see summary sheet for update)    OBJECTIVE  Modality rationale: decrease edema, decrease inflammation, decrease pain, and increase tissue extensibility to improve the patients ability to fully participate in rehab and and post rehab. Min Type Additional Details    [] Estim: []Att   []Unatt  []TENS instruct                 []IFC  []Premod []NMES                       []Other:  []w/US   []w/ice   []w/heat  Position:  Location:    []  Traction: [] Cervical       []Lumbar                       [] Prone          []Supine                       []Intermittent   []Continuous Lbs:  [] before manual  [] after manual    []  Ultrasound: []Continuous   [] Pulsed                           []1MHz   []3MHz Location:  W/cm2:   10 [x]  Ice     []  heat  []  Ice massage Position: Seated  Location: R knee    []  Vasopneumatic Device Pressure: [] lo [] med [] hi   Temp: [] lo [] med [] hi   [] Skin assessment post-treatment:  []intact []redness- no adverse reaction       []redness - adverse reaction:     50 min Therapeutic Exercise:  [x] See flow sheet :   Rationale: increase ROM, increase strength, improve coordination, improve balance, and increase proprioception to improve the patients ability to restore functiona and mobility allowing for ease with daily activities.             With TE  TA   NR  GT   [facility] [place] Education: [x] Review HEP    [] Progressed/Changed HEP based on:   [] positioning   [] body mechanics   [] transfers   [] heat/ice application        Pain Level (0-10 scale) post treatment: 2    ASSESSMENT/Changes in Function:  Session began with bike for active warm up. Followed by hamstring and gastroc stretches to decrease tightness and soreness. Continued with exercises per flowsheet to decrease range of motion and strength deficits in R knee. Continued step ups to improve LE stability and muscular endurance. Resistance continued (2#) LAQ and [address] with no adverse effects. CP post rehab to combat soreness. Will continue POC progressing to patient's tolerance. Patient will continue to benefit from skilled PT services to modify and progress therapeutic interventions, address functional mobility deficits, address ROM deficits, address strength deficits, analyze and address soft tissue restrictions, analyze and cue movement patterns, analyze and modify body mechanics/ergonomics, assess and modify postural abnormalities, and address imbalance/dizziness to attain remaining goals.      [x]  See Plan of Care  []  See progress note/recertification  []  See Discharge Summary         PLAN  []  Upgrade activities as tolerated     [x]  Continue plan of care  []  Update interventions per flow sheet       []  Discharge due to:_  []  Other:    Breezy MENDES  11/17/2022  11:10 AM

## 2022-11-22 ENCOUNTER — HOSPITAL ENCOUNTER (OUTPATIENT)
Dept: PHYSICAL THERAPY | Age: 32
Discharge: HOME | End: 2022-11-22
Payer: MEDICAID

## 2022-11-22 PROCEDURE — 97110 THERAPEUTIC EXERCISES: CPT

## 2022-11-22 PROCEDURE — 97016 VASOPNEUMATIC DEVICE THERAPY: CPT

## 2022-11-22 NOTE — PROGRESS NOTES
PT DAILY TREATMENT NOTE 8-14    Patient Name: Micki Garsia  Date:2022  : 1990  [x]  Patient  Verified  Payor: BLUE CROSS MEDICAID / Plan: [address] [place] [address] / Product Type: Managed Care Medicaid /    In time:1006  Out time: 36907  Total Treatment Time (min): 72  Total Timed Codes (min): 52  1:1 Treatment Time (min): 52  Visit #:  8    Treatment Area: Pain in right knee [M25.561]    SUBJECTIVE  Pt reports that her knee has been hurting really bad these last couple of cold mornings. Pain Level (0-10 scale): 8    Any medication changes, allergies to medications, adverse drug reactions, diagnosis change, or new procedure performed?: [x] No    [] Yes (see summary sheet for update)    OBJECTIVE  Modality rationale: decrease edema, decrease inflammation, decrease pain, and increase tissue extensibility to improve the patients ability to fully participate in rehab and and post rehab.    Min Type Additional Details    [] Estim: []Att   []Unatt  []TENS instruct                 []IFC  []Premod []NMES                       []Other:  []w/US   []w/ice   []w/heat  Position:  Location:    []  Traction: [] Cervical       []Lumbar                       [] Prone          []Supine                       []Intermittent   []Continuous Lbs:  [] before manual  [] after manual    []  Ultrasound: []Continuous   [] Pulsed                           []1MHz   []3MHz Location:  W/cm2:   10 [x]  Ice     []  heat  []  Ice massage Position: Seated  Location: R knee   10 [x]  Vasopneumatic Device Pressure: [x] lo [] med [] hi   Temp: [x] lo [] med [] hi   [] Skin assessment post-treatment:  []intact []redness- no adverse reaction       []redness - adverse reaction:     52 min Therapeutic Exercise:  [x] See flow sheet :   Rationale: increase ROM, increase strength, improve coordination, improve balance, and increase proprioception to improve the patients ability to restore functiona and mobility allowing for ease with daily activities. With TE  TA   NR  GT   [facility] Patient Education: [x] Review HEP    [] Progressed/Changed HEP based on:   [] positioning   [] body mechanics   [] transfers   [] heat/ice application        Pain Level (0-10 scale) post treatment: 2    ASSESSMENT/Changes in Function:  Session began with bike for active warm up. Followed by hamstring and gastroc stretches to decrease tightness and soreness. Continued with exercises per flowsheet to decrease range of motion and strength deficits in R knee. Continued step ups to improve LE stability and muscular endurance. Resistance continued (2#) LAQ and [address] with no adverse effects. CP post rehab to combat soreness. Will continue POC progressing to patient's tolerance. Patient will continue to benefit from skilled PT services to modify and progress therapeutic interventions, address functional mobility deficits, address ROM deficits, address strength deficits, analyze and address soft tissue restrictions, analyze and cue movement patterns, analyze and modify body mechanics/ergonomics, assess and modify postural abnormalities, and address imbalance/dizziness to attain remaining goals.      [x]  See Plan of Care  []  See progress note/recertification  []  See Discharge Summary         PLAN  []  Upgrade activities as tolerated     [x]  Continue plan of care  []  Update interventions per flow sheet       []  Discharge due to:_  []  Other:    VAUGHN Howe   11/22/2022  1:02 PM

## 2022-11-29 ENCOUNTER — HOSPITAL ENCOUNTER (OUTPATIENT)
Dept: PHYSICAL THERAPY | Age: 32
End: 2022-11-29
Payer: MEDICAID

## 2022-11-29 PROCEDURE — 97110 THERAPEUTIC EXERCISES: CPT

## 2022-11-29 PROCEDURE — 97016 VASOPNEUMATIC DEVICE THERAPY: CPT

## 2022-11-29 NOTE — PROGRESS NOTES
PT DAILY TREATMENT NOTE 8-14    Patient Name: Raleigh Mancini  Date:2022  : 1990  [x]  Patient  Verified  Payor: BLUE CROSS MEDICAID / Plan: [facility] [employer] / Product Type: Managed Care Medicaid /    In time:1002  Out time: 1114  Total Treatment Time (min): 72  Total Timed Codes (min): 62  1:1 Treatment Time (min): 62  Visit #: 9    Treatment Area: Pain in right knee [M25.561]    SUBJECTIVE  Pt reports that her knee is not hurting too badly today. Pain Level (0-10 scale): 4/10    Any medication changes, allergies to medications, adverse drug reactions, diagnosis change, or new procedure performed?: [x] No    [] Yes (see summary sheet for update)    OBJECTIVE  Modality rationale: decrease edema, decrease inflammation, decrease pain, and increase tissue extensibility to improve the patients ability to fully participate in rehab and and post rehab.    Min Type Additional Details    [] Estim: []Att   []Unatt  []TENS instruct                 []IFC  []Premod []NMES                       []Other:  []w/US   []w/ice   []w/heat  Position:  Location:    []  Traction: [] Cervical       []Lumbar                       [] Prone          []Supine                       []Intermittent   []Continuous Lbs:  [] before manual  [] after manual    []  Ultrasound: []Continuous   [] Pulsed                           []1MHz   []3MHz Location:  W/cm2:    []  Ice     []  heat  []  Ice massage Position: Seated  Location: R knee    []  Vasopneumatic Device Pressure: [] lo [] med [] hi   Temp: [] lo [] med [] hi   [] Skin assessment post-treatment:  []intact []redness- no adverse reaction       []redness - adverse reaction:     62 min Therapeutic Exercise:  [x] See flow sheet :   Rationale: increase ROM, increase strength, improve coordination, improve balance, and increase proprioception to improve the patients ability to restore functional and mobility allowing for ease with daily activities, return to prior level of function before injury/illness with reduced pain, achieving optimal strength and function to perform household tasks, daily activities, and return to community events, and/or work. With TE  TA   NR  GT   [facility] [place]: [x] Review HEP    [] Progressed/Changed HEP based on:   [] positioning   [] body mechanics   [] transfers   [] heat/ice application        Pain Level (0-10 scale) post treatment: 2    ASSESSMENT/Changes in Function:  Session began with bike for active warm up with full revolutions. Followed by hamstring and gastroc stretches to decrease tightness and soreness. Continued with exercises per flowsheet to decrease range of motion and strength deficits in R knee. Continued step ups to improve LE stability and muscular endurance. Resistance continued (2#) LAQ and [place] with no adverse effects. Increased reps today as noted per flow sheet on most exercises, added seated flexion. PROM 124 flex. Vaso post rehab to combat soreness. Will continue POC progressing to patient's tolerance. Patient will continue to benefit from skilled PT services to modify and progress therapeutic interventions, address functional mobility deficits, address ROM deficits, address strength deficits, analyze and address soft tissue restrictions, analyze and cue movement patterns, analyze and modify body mechanics/ergonomics, assess and modify postural abnormalities, and address imbalance/dizziness to attain remaining goals.      [x]  See Plan of Care  []  See progress note/recertification  []  See Discharge Summary         PLAN  []  Upgrade activities as tolerated     [x]  Continue plan of care  []  Update interventions per flow sheet       []  Discharge due to:_  []  Other:    VAUGHN Abad   11/29/2022  11:33  AM

## 2022-12-02 ENCOUNTER — HOSPITAL ENCOUNTER (OUTPATIENT)
Dept: PHYSICAL THERAPY | Age: 32
Discharge: HOME | End: 2022-12-02
Payer: MEDICAID

## 2022-12-02 PROCEDURE — 97110 THERAPEUTIC EXERCISES: CPT

## 2022-12-02 NOTE — PROGRESS NOTES
PT DAILY TREATMENT NOTE 8    Patient Name: Gautam Drew  Date:2022  : 1990  [x]  Patient  Verified  Payor: BLUE CROSS MEDICAID / Plan: [facility] [employer]ERS [employer] / Product Type: Managed Care Medicaid /    In time:1028  Out time:1135  Total Treatment Time (min): 67  Total Timed Codes (min): 47  1:1 Treatment Time (min): 47   Visit #: 10    Treatment Area: Pain in right knee [M25.561]    SUBJECTIVE  Pt states she has not taken her anti inflammatory medication in two days and has increase pain and swelling since stopping. She had medication refilled and took first dosage today. Pain Level (0-10 scale): 6    Any medication changes, allergies to medications, adverse drug reactions, diagnosis change, or new procedure performed?: [x] No    [] Yes (see summary sheet for update)    OBJECTIVE  Modality rationale: decrease edema, decrease inflammation, decrease pain, and increase tissue extensibility to improve the patients ability to fully participate in rehab and post rehab activities.     Min Type Additional Details    [] Estim: []Att   []Unatt  []TENS instruct                 []IFC  []Premod []NMES                       []Other:  []w/US   []w/ice   []w/heat  Position:  Location:    []  Traction: [] Cervical       []Lumbar                       [] Prone          []Supine                       []Intermittent   []Continuous Lbs:  [] before manual  [] after manual    []  Ultrasound: []Continuous   [] Pulsed                           []1MHz   []3MHz Location:  W/cm2:   10 + 10 [x]  Ice     [x]  heat  []  Ice massage Position: seated with R LE propped  Location: R knee    []  Vasopneumatic Device Pressure: [] lo [] med [] hi   Temp: [] lo [] med [] hi   [] Skin assessment post-treatment:  []intact []redness- no adverse reaction       []redness - adverse reaction:     45 min Therapeutic Exercise:  [] See flow sheet :   Rationale: increase ROM, increase strength, improve coordination, improve balance, and increase proprioception to improve the patients ability to return to PLOF with full AROM and strength to complete daily activities. With TE  TA   NR  GT   [facility] Patient Education: [x] Review HEP    [] Progressed/Changed HEP based on:   [] positioning   [] body mechanics   [] transfers   [] heat/ice application        Pain Level (0-10 scale) post treatment: 0    ASSESSMENT/Changes in Function: Session began with MHP per patient's request followed by an active warm up via stationary bicycle. Continued with POC addressing range of motion and strength deficits in R knee. Pt shows a good understanding of all exercises needing little to no cueing to ensure optimal muscle engagement. Introduced banded bridges to challenged posterior chain and lateral strength. Also incorporated mini squats to promote functional movement. Pt had an audible painful pop with first repetition. Pt then completed 10 repetitions without issue. CP post rehab to combat soreness. Plan to continue POC progressing as able. Patient will continue to benefit from skilled PT services to modify and progress therapeutic interventions, address functional mobility deficits, address ROM deficits, address strength deficits, analyze and address soft tissue restrictions, analyze and cue movement patterns, analyze and modify body mechanics/ergonomics, assess and modify postural abnormalities, and address imbalance/dizziness to attain remaining goals.      [x]  See Plan of Care  []  See progress note/recertification  []  See Discharge Summary         PLAN  []  Upgrade activities as tolerated     [x]  Continue plan of care  []  Update interventions per flow sheet       []  Discharge due to:_  []  Other:    Michelle Stovall  12/2/2022  10:30 AM

## 2022-12-06 ENCOUNTER — HOSPITAL ENCOUNTER (OUTPATIENT)
Dept: PHYSICAL THERAPY | Age: 32
Discharge: HOME | End: 2022-12-06
Payer: MEDICAID

## 2022-12-06 PROCEDURE — 97016 VASOPNEUMATIC DEVICE THERAPY: CPT

## 2022-12-06 PROCEDURE — 97164 PT RE-EVAL EST PLAN CARE: CPT

## 2022-12-06 PROCEDURE — 97110 THERAPEUTIC EXERCISES: CPT
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
## 2022-12-06 NOTE — PROGRESS NOTES
PT DAILY TREATMENT NOTE 8    Patient Name: Phi Aguirre  Date:2022  : 1990  [x]  Patient  Verified  Payor: BLUE CROSS MEDICAID / Plan: [address] [place] [address] / Product Type: Managed Care Medicaid /    In time: 7863  Out time: 0773  Total Treatment Time (min): 56  Total Timed Codes (min): 31  1:1 Treatment Time (min): 56   Visit # 11      Treatment Area: Pain in right knee [M25.561]    SUBJECTIVE  Pt reports continued R knee pain. She is compliant with daily HEP.   Pain Level (0-10 scale): 4/10  Any medication changes, allergies to medications, adverse drug reactions, diagnosis change, or new procedure performed?: [x] No    [] Yes (see summary sheet for update)        OBJECTIVE  Modality rationale: decrease edema, decrease inflammation, and decrease pain to improve the patients ability to ambulate   Min Type Additional Details    [] Estim: []Att   []Unatt  []TENS instruct                 []IFC  []Premod []NMES                       []Other:  []w/US   []w/ice   []w/heat  Position:  Location:    []  Traction: [] Cervical       []Lumbar                       [] Prone          []Supine                       []Intermittent   []Continuous Lbs:  [] before manual  [] after manual    []  Ultrasound: []Continuous   [] Pulsed                           []1MHz   []3MHz Location:  W/cm2:         []  Ice     []  heat  []  Ice massage Position:  Location:   10 [x]  Vasopneumatic Device Pressure: [x] lo [] med [] hi   Temp: [x] lo [] med [] hi   [x] Skin assessment post-treatment:  [x]intact []redness- no adverse reaction       []redness - adverse reaction:       31 min Therapeutic Exercise:  [x] See flow sheet :   Rationale: increase ROM and increase strength to improve the patients ability to ambulate    15 min PT Reassessment         With TE Patient Education: [x] Review HEP    [] Progressed/Changed HEP based on:   [] positioning   [] body mechanics   [] transfers   [] heat/ice application Pain Level (0-10 scale) post treatment: 0/10    ASSESSMENT/Changes in Function:   Pt participates in PT reassessment today to assess pt progress in therapy this reporting period. Session initiated on stepper  followed by self-stretching and PRE. Today's Tx session emphasized exercises per POC to increase R knee ROM and strength with Pt tolerating Tx with no adverse effects. Pt demonstrates increased ROM when performing exercises during today's Tx session. Pt HEP updated and pt was educated on exercise dosing, DOMS, pain science, and safety awareness. PT will continue per POC, progressing as tolerated. Patient will continue to benefit from skilled PT services to modify and progress therapeutic interventions, address functional mobility deficits, address ROM deficits, address strength deficits, analyze and address soft tissue restrictions, analyze and cue movement patterns, analyze and modify body mechanics/ergonomics, assess and modify postural abnormalities, and address imbalance/dizziness to attain remaining goals.      []  See Plan of Care  [x]  See progress note/recertification  []  See Discharge Summary             PLAN  []  Upgrade activities as tolerated     []  Continue plan of care  []  Update interventions per flow sheet       []  Discharge due to:_  []  Other:_      Kranthi Milner, PT, DPT 12/6/2022  2:21 PM

## 2024-02-05 ENCOUNTER — HOSPITAL ENCOUNTER (OUTPATIENT)
Age: 34
Discharge: HOME | End: 2024-02-08
Payer: COMMERCIAL

## 2024-02-05 VITALS — OXYGEN SATURATION: 96 %

## 2024-02-05 DIAGNOSIS — J30.1 ALLERGIC RHINITIS DUE TO POLLEN, UNSPECIFIED SEASONALITY: ICD-10-CM

## 2024-02-05 PROCEDURE — 94729 DIFFUSING CAPACITY: CPT

## 2024-02-05 PROCEDURE — 94060 EVALUATION OF WHEEZING: CPT

## 2024-02-05 PROCEDURE — 94640 AIRWAY INHALATION TREATMENT: CPT

## 2024-02-05 PROCEDURE — 6360000002 HC RX W HCPCS: Performed by: PHYSICIAN ASSISTANT

## 2024-02-05 PROCEDURE — 94726 PLETHYSMOGRAPHY LUNG VOLUMES: CPT

## 2024-02-05 RX ORDER — ALBUTEROL SULFATE 2.5 MG/3ML
2.5 SOLUTION RESPIRATORY (INHALATION) ONCE
Status: DISCONTINUED | OUTPATIENT
Start: 2024-02-05 | End: 2024-02-05

## 2024-02-05 RX ORDER — ALBUTEROL SULFATE 2.5 MG/3ML
2.5 SOLUTION RESPIRATORY (INHALATION) ONCE
Status: COMPLETED | OUTPATIENT
Start: 2024-02-05 | End: 2024-02-05

## 2024-02-05 RX ADMIN — ALBUTEROL SULFATE 2.5 MG: 2.5 SOLUTION RESPIRATORY (INHALATION) at 10:32

## 2024-02-05 NOTE — PROGRESS NOTES
Patient had to push to get a good effort.  She was not exhaling as forcefully as she did in the first attempt, after many attempts we got 3 acceptables FVC's.  Breathsounds clear pre and post.     02/05/24 1032   Treatment   Treatment Type HHN   $Treatment Type $Inhaled Therapy/Meds   Medications Albuterol   Pre-Tx Pulse 83   Pre-Tx Resps 16   Breath Sounds Pre-Tx PRATEEK Clear   Breath Sounds Pre-Tx LLL Clear   Breath Sounds Pre-Tx RUL Clear   Breath Sounds Pre-Tx RML Clear   Breath Sounds Pre-Tx RLL Clear   Breath Sounds Post-Tx PRATEEK Clear   Breath Sounds Post-Tx LLL Clear   Breath Sounds Post-Tx RUL Clear   Breath Sounds Post-Tx RML Clear   Breath Sounds Post-Tx RLL Clear   Post-Tx Pulse 85   Post-Tx Resps 16   Delivery Source Mouthpiece;Air   Position Gilberto's   Treatment Tolerance Well   Duration 7   Is patient on O2? N   Oxygen Therapy/Pulse Ox   SpO2 96 %

## 2024-03-22 RX ORDER — SEMAGLUTIDE 1.34 MG/ML
INJECTION, SOLUTION SUBCUTANEOUS
COMMUNITY

## 2024-03-22 RX ORDER — PROCHLORPERAZINE MALEATE 5 MG/1
5 TABLET ORAL EVERY 6 HOURS PRN
COMMUNITY
Start: 2021-06-20

## 2024-03-22 RX ORDER — LEVOCETIRIZINE DIHYDROCHLORIDE 5 MG/1
5 TABLET, FILM COATED ORAL NIGHTLY
COMMUNITY

## 2024-03-22 RX ORDER — ESZOPICLONE 3 MG/1
3 TABLET, FILM COATED ORAL NIGHTLY
COMMUNITY

## 2024-03-22 RX ORDER — ONDANSETRON 8 MG/1
8 TABLET, ORALLY DISINTEGRATING ORAL EVERY 8 HOURS PRN
COMMUNITY
Start: 2023-10-31

## 2024-03-22 NOTE — PERIOP NOTE
Instructions for your procedure at [facility]      Today's Date: 3/22/2024      Patient's Name: Cherelle Stevens      Procedure Date: 4/5/2024        Please enter the main entrance of the hospital and check-in at the  located in the lobby.      Do NOT eat or drink anything, including candy, gum, or ice chips after midnight prior to your procedure, unless it is part of your prep.  Brush your teeth before coming to the hospital.You may swish with water, but do not swallow.  No smoking/Vaping/E-Cigarettes 24 hours prior to the day of procedure.  No alcohol 24 hours prior to the day of procedure.  No recreational drugs for one week prior to the day of procedure.  Bring Photo ID, Insurance information, and Co-pay if required on day of procedure.  Bring in pertinent legal documents, such as, Medical Power of , DNR, Advance Directive, etc.  Leave all other valuables, including money/purse, at home.  Remove jewelry, including ALL body piercings, nail polish, acrylic nails, and makeup (including mascara); no lotions, powders, deodorant, and/or perfume/cologne/after shave on the skin.  Glasses and dentures may be worn to the hospital.  They must be removed prior to procedure. Please bring case/container for glasses or dentures.  11. Contacts should not be worn on day of procedure.   12. Call the office ([phone]) if you have symptoms of a cold or illness within 24-48 hours prior to your procedure.   13. AN ADULT (relative or friend 18 years or older) MUST DRIVE YOU HOME AFTER YOUR PROCEDURE.   14. Please make arrangements for a responsible adult (18 years or older) to be with you for 24 hours after your procedure.   15. ONE VISITOR will be allowed in the waiting area during your procedure.       Special Instructions:      Bring list of CURRENT medications.  Follow instructions from the office regarding Bowel Prep, Vitamins, Iron, Blood Thinners, Insulin, Seizure, and Blood  Pressure/Heart medications.  Bring inhaler.  Bring CPAP machine.    Any questions regarding prep, please call the office at [phone].    For any questions or concerns on the day of procedure, please call the Endo Suite at [phone].    These surgical instructions were reviewed with patient during the PAT phone call.

## 2024-04-04 ENCOUNTER — ANESTHESIA EVENT (OUTPATIENT)
Facility: HOSPITAL | Age: 34
End: 2024-04-04
Payer: COMMERCIAL

## 2024-04-05 ENCOUNTER — ANESTHESIA (OUTPATIENT)
Facility: HOSPITAL | Age: 34
End: 2024-04-05
Payer: COMMERCIAL

## 2024-04-05 ENCOUNTER — HOSPITAL ENCOUNTER (OUTPATIENT)
Facility: HOSPITAL | Age: 34
Setting detail: OUTPATIENT SURGERY
Discharge: HOME | End: 2024-04-05
Attending: STUDENT IN AN ORGANIZED HEALTH CARE EDUCATION/TRAINING PROGRAM | Admitting: STUDENT IN AN ORGANIZED HEALTH CARE EDUCATION/TRAINING PROGRAM
Payer: COMMERCIAL

## 2024-04-05 VITALS
DIASTOLIC BLOOD PRESSURE: 70 MMHG | WEIGHT: 241 LBS | RESPIRATION RATE: 18 BRPM | HEART RATE: 70 BPM | BODY MASS INDEX: 42.7 KG/M2 | HEIGHT: 63 IN | OXYGEN SATURATION: 100 % | SYSTOLIC BLOOD PRESSURE: 125 MMHG | TEMPERATURE: 97 F

## 2024-04-05 LAB
GLUCOSE BLD STRIP.AUTO-MCNC: 122 MG/DL (ref 70–110)
GLUCOSE BLD STRIP.AUTO-MCNC: 98 MG/DL (ref 70–110)
HCG UR QL: NEGATIVE

## 2024-04-05 PROCEDURE — 2580000003 HC RX 258: Performed by: ANESTHESIOLOGY

## 2024-04-05 PROCEDURE — 3600007502: Performed by: STUDENT IN AN ORGANIZED HEALTH CARE EDUCATION/TRAINING PROGRAM

## 2024-04-05 PROCEDURE — 7100000010 HC PHASE II RECOVERY - FIRST 15 MIN: Performed by: STUDENT IN AN ORGANIZED HEALTH CARE EDUCATION/TRAINING PROGRAM

## 2024-04-05 PROCEDURE — 88305 TISSUE EXAM BY PATHOLOGIST: CPT

## 2024-04-05 PROCEDURE — 82962 GLUCOSE BLOOD TEST: CPT

## 2024-04-05 PROCEDURE — 2500000003 HC RX 250 WO HCPCS: Performed by: NURSE ANESTHETIST, CERTIFIED REGISTERED

## 2024-04-05 PROCEDURE — 3700000000 HC ANESTHESIA ATTENDED CARE: Performed by: STUDENT IN AN ORGANIZED HEALTH CARE EDUCATION/TRAINING PROGRAM

## 2024-04-05 PROCEDURE — 3600007512: Performed by: STUDENT IN AN ORGANIZED HEALTH CARE EDUCATION/TRAINING PROGRAM

## 2024-04-05 PROCEDURE — 81025 URINE PREGNANCY TEST: CPT

## 2024-04-05 PROCEDURE — 3700000001 HC ADD 15 MINUTES (ANESTHESIA): Performed by: STUDENT IN AN ORGANIZED HEALTH CARE EDUCATION/TRAINING PROGRAM

## 2024-04-05 PROCEDURE — 2709999900 HC NON-CHARGEABLE SUPPLY: Performed by: STUDENT IN AN ORGANIZED HEALTH CARE EDUCATION/TRAINING PROGRAM

## 2024-04-05 PROCEDURE — 7100000000 HC PACU RECOVERY - FIRST 15 MIN: Performed by: STUDENT IN AN ORGANIZED HEALTH CARE EDUCATION/TRAINING PROGRAM

## 2024-04-05 PROCEDURE — 6360000002 HC RX W HCPCS: Performed by: NURSE ANESTHETIST, CERTIFIED REGISTERED

## 2024-04-05 RX ORDER — LIDOCAINE HYDROCHLORIDE 20 MG/ML
INJECTION, SOLUTION EPIDURAL; INFILTRATION; INTRACAUDAL; PERINEURAL PRN
Status: DISCONTINUED | OUTPATIENT
Start: 2024-04-05 | End: 2024-04-05 | Stop reason: SDUPTHER

## 2024-04-05 RX ORDER — SODIUM CHLORIDE, SODIUM LACTATE, POTASSIUM CHLORIDE, CALCIUM CHLORIDE 600; 310; 30; 20 MG/100ML; MG/100ML; MG/100ML; MG/100ML
INJECTION, SOLUTION INTRAVENOUS CONTINUOUS
Status: DISCONTINUED | OUTPATIENT
Start: 2024-04-05 | End: 2024-04-05 | Stop reason: HOSPADM

## 2024-04-05 RX ORDER — PROPOFOL 10 MG/ML
INJECTION, EMULSION INTRAVENOUS PRN
Status: DISCONTINUED | OUTPATIENT
Start: 2024-04-05 | End: 2024-04-05 | Stop reason: SDUPTHER

## 2024-04-05 RX ORDER — LIDOCAINE HYDROCHLORIDE 10 MG/ML
1 INJECTION, SOLUTION EPIDURAL; INFILTRATION; INTRACAUDAL; PERINEURAL
Status: DISCONTINUED | OUTPATIENT
Start: 2024-04-05 | End: 2024-04-05 | Stop reason: HOSPADM

## 2024-04-05 RX ADMIN — SODIUM CHLORIDE, POTASSIUM CHLORIDE, SODIUM LACTATE AND CALCIUM CHLORIDE: 600; 310; 30; 20 INJECTION, SOLUTION INTRAVENOUS at 07:11

## 2024-04-05 RX ADMIN — PROPOFOL 50 MG: 10 INJECTION, EMULSION INTRAVENOUS at 08:16

## 2024-04-05 RX ADMIN — LIDOCAINE HYDROCHLORIDE 40 MG: 20 INJECTION, SOLUTION EPIDURAL; INFILTRATION; INTRACAUDAL; PERINEURAL at 08:16

## 2024-04-05 RX ADMIN — PROPOFOL 50 MG: 10 INJECTION, EMULSION INTRAVENOUS at 08:19

## 2024-04-05 ASSESSMENT — PAIN - FUNCTIONAL ASSESSMENT: PAIN_FUNCTIONAL_ASSESSMENT: 0-10

## 2024-04-05 NOTE — H&P
[email or web address]  [phone]    Chief Complaint: GERD, diarrhea    PMH:   Past Medical History:   Diagnosis Date    Arthritis     Asthma     Chiari I malformation (HCC)     Depression     Diabetes mellitus (HCC)     GERD (gastroesophageal reflux disease)     Headache     History of chicken pox     PONV (postoperative nausea and vomiting)     Sleep apnea     using cpap     Allergies:   Allergies   Allergen Reactions    Latex      Other reaction(s): Unknown (comments)    Adhesive Tape      Other reaction(s): Unknown (comments)    Kiwi Extract Hives     Medications:   Current Facility-Administered Medications:     lactated ringers IV soln infusion, , IntraVENous, Continuous, Marco Antonio Daly MD, Last Rate: 100 mL/hr at 04/05/24 0711, New Bag at 04/05/24 0711  FH:   Family History   Problem Relation Age of Onset    Diabetes Father     Osteoarthritis Mother     Diabetes Mother     Cancer Father     Hypertension Father     Asthma Mother     Hypertension Mother      Social:   Social History     Socioeconomic History    Marital status: Single     Spouse name: None    Number of children: None    Years of education: None    Highest education level: None   Tobacco Use    Smoking status: Former     Current packs/day: 0.25     Types: Cigarettes    Smokeless tobacco: Never   Vaping Use    Vaping Use: Never used   Substance and Sexual Activity    Alcohol use: Not Currently    Drug use: Never     Surgical H:   Past Surgical History:   Procedure Laterality Date    ADENOIDECTOMY      HEENT      tubes in ears     POSTERIOR FOSSA DECOMPRESSION  2021    posterior fossa craniectomy, C1 laminectomy for Chiari malformation decompression    TONSILLECTOMY      WISDOM TOOTH EXTRACTION         ROS: positive for diarrhea and reflux symptoms    Physical Exam: /79   Pulse 87   Temp 97.6 °F (36.4 °C) (Oral)   Resp 20   Ht 1.6 m (5' 3\")   Wt 109.3 kg (241 lb)   SpO2 97%   BMI 42.69 kg/m²   General appearance: alert, no  distress  Eyes: pupils equal and reactive, extraocular eye movements intact  Nodes: No gross adenopathy in neck.  Skin: no spider angiomata, jaundice, palmar erythema   Respiratory: clear to auscultation bilaterally  Cardiovascular: regular heart rate, no murmurs, no JVD, normal rate and regular rhythm  Abdomen: soft, non-tender, liver not enlarged, spleen not palpable, no obvious ascites  Extremities: no muscle wasting, no gross arthritic changes  Neurologic: alert and oriented, cranial nerves grossly intact, no asterixis    Labs:   Recent Results (from the past 24 hour(s))   POCT Glucose    Collection Time: 04/05/24  7:06 AM   Result Value Ref Range    POC Glucose 122 (H) 70 - 110 mg/dL       Imp/ Plan: Will proceed with Upper endoscopy (Esophagogastroduodenoscopy) as planned. Risk benefits alternative including but not limited to infection, bleeding, perforation of viscous, allergic reaction and resultant morbidity and mortality was discussed. Chance of missing a significant lesion due to various reasons were discussed.    Roldan Mathis MD   [facility]

## 2024-04-05 NOTE — ANESTHESIA PRE PROCEDURE
results found for: \"NA\", \"K\", \"CL\", \"CO2\", \"BUN\", \"CREATININE\", \"GFRAA\", \"AGRATIO\", \"LABGLOM\", \"GLUCOSE\", \"GLU\", \"PROT\", \"CALCIUM\", \"BILITOT\", \"ALKPHOS\", \"AST\", \"ALT\"    POC Tests:   Recent Labs     04/05/24  0706   POCGLU 122*       Coags: No results found for: \"PROTIME\", \"INR\", \"APTT\"    HCG (If Applicable): No results found for: \"PREGTESTUR\", \"PREGSERUM\", \"HCG\", \"HCGQUANT\"     ABGs: No results found for: \"PHART\", \"PO2ART\", \"TJP1PNR\", \"NYI3VMJ\", \"BEART\", \"J3NGRNHS\"     Type & Screen (If Applicable):  No results found for: \"LABABO\", \"LABRH\"    Drug/Infectious Status (If Applicable):  No results found for: \"HIV\", \"HEPCAB\"    COVID-19 Screening (If Applicable): No results found for: \"COVID19\"        Anesthesia Evaluation     history of anesthetic complications: PONV.  Airway: Mallampati: II  TM distance: >3 FB   Neck ROM: full  Mouth opening: > = 3 FB   Dental:    (+) poor dentition      Pulmonary:normal exam    (+)     sleep apnea: on CPAP,       asthma:                            Cardiovascular:  Exercise tolerance: good (>4 METS)          Rhythm: regular  Rate: normal                    Neuro/Psych:   (+) psychiatric history:            GI/Hepatic/Renal:   (+) GERD:, morbid obesity          Endo/Other:    (+) DiabetesType II DM.                  ROS comment: Ozympic weekly, not this week.   Abdominal: normal exam  (+) obese          Vascular:          Other Findings:       Anesthesia Plan      MAC     ASA 3       Induction: intravenous.      Anesthetic plan and risks discussed with patient.                    Marco Antonio Daly MD   4/5/2024

## 2024-04-05 NOTE — ANESTHESIA POSTPROCEDURE EVALUATION
Department of Anesthesiology  Postprocedure Note    Patient: Cherelle Stevens  MRN: [id number]  YOB: 1990  Date of evaluation: 4/5/2024    Procedure Summary       Date: 04/05/24 Room / Location: [facility] ENDO 02 / [facility] ENDOSCOPY    Anesthesia Start: 0809 Anesthesia Stop: 0828    Procedure: ESOPHAGOGASTRODUODENOSCOPY (Upper GI Region) Diagnosis:       Gastroesophageal reflux disease, unspecified whether esophagitis present      Diarrhea, unspecified type      (Gastroesophageal reflux disease, unspecified whether esophagitis present [K21.9])      (Diarrhea, unspecified type [R19.7])    Surgeons: Roldan Mathis MD Responsible Provider: Marco Antonio Daly MD    Anesthesia Type: MAC ASA Status: 3            Anesthesia Type: MAC    Juan Phase I: Juan Score: 10    Juan Phase II: Juan Score: 10    Anesthesia Post Evaluation    Patient location during evaluation: PACU  Patient participation: complete - patient participated  Level of consciousness: awake  Airway patency: patent  Nausea & Vomiting: no nausea  Cardiovascular status: blood pressure returned to baseline  Respiratory status: acceptable  Hydration status: euvolemic  Pain management: adequate    No notable events documented.

## 2024-04-05 NOTE — DISCHARGE INSTRUCTIONS
Upper GI Endoscopy: What to Expect at Home  Your Recovery  You had an upper GI endoscopy. Your doctor used a thin, lighted tube that bends to look at the inside of your esophagus, your stomach, and the first part of the small intestine, called the duodenum.  After you have an endoscopy, you will stay at the hospital or clinic for 1 to 2 hours. This will allow the medicine to wear off. You will be able to go home after your doctor or nurse checks to make sure that you're not having any problems.  You may have to stay overnight if you had treatment during the test. You may have a sore throat for a day or two after the test.  This care sheet gives you a general idea about what to expect after the test.  How can you care for yourself at home?  Activity   Rest as much as you need to after you go home.  You should be able to go back to your usual activities the day after the test.  Diet   Follow your doctor's directions for eating after the test.  Drink plenty of fluids (unless your doctor has told you not to).  Medications   If you have a sore throat the day after the test, use an over-the-counter spray to numb your throat.  Follow-up care is a key part of your treatment and safety. Be sure to make and go to all appointments, and call your doctor if you are having problems. It's also a good idea to know your test results and keep a list of the medicines you take.  When should you call for help?   Call 911 anytime you think you may need emergency care. For example, call if:    You passed out (lost consciousness).     You have trouble breathing.     You pass maroon or bloody stools.   Call your doctor now or seek immediate medical care if:    You have pain that does not get better after your take pain medicine.     You have new or worse belly pain.     You have blood in your stools.     You are sick to your stomach and cannot keep fluids down.     You have a fever.     You cannot pass stools or gas.   Watch closely for  changes in your health, and be sure to contact your doctor if:    Your throat still hurts after a day or two.     You do not get better as expected.   Where can you learn more?  Go to https://[email or web address].net/[employer]  Enter J454 in the search box to learn more about \"Upper GI Endoscopy: What to Expect at Home.\"  Current as of: September 8, 2021               Content Version: 13.2  © 2006-2022 [employer].   Care instructions adapted under license by [employer] (which disclaims liability or warranty for this information). If you have questions about a medical condition or this instruction, always ask your healthcare professional. [employer] disclaims any warranty or liability for your use of this information.      DISCHARGE SUMMARY from Nurse     POST-PROCEDURE INSTRUCTIONS:    Call your Physician if you:  Observe any excess bleeding.  Develop a temperature over 100.5o F.  Experience abdominal, shoulder or chest pain.  Notice any signs of decreased circulation or nerve impairment to an extremity such as a change in color, persistent numbness, tingling, coldness or increase in pain.  Vomit blood or you have nausea and vomiting lasting longer than 4 hours.  Are unable to take medications.  Are unable to urinate within 8 hours after discharge following general anesthesia or intravenous sedation.    For the next 24 hours after receiving general anesthesia or intravenous sedation, or while taking prescription Narcotics, limit your activities:  Do NOT drive a motor vehicle, operate hazard machinery or power tools, or perform tasks that require coordination.  The medication you received during your procedure may have some effect on your mental awareness.  Do NOT make important personal or business decisions.  The medication you received during your procedure may have some effect on your mental awareness.  Do NOT drink alcoholic beverages.  These drinks do not mix well with

## (undated) DEVICE — STERILE POLYISOPRENE POWDER-FREE SURGICAL GLOVES: Brand: PROTEXIS

## (undated) DEVICE — BITE BLOCK ENDOSCP UNIV AD 6 TO 9.4 MM

## (undated) DEVICE — ENDOSCOPY PUMP TUBING/ CAP SET: Brand: ERBE

## (undated) DEVICE — YANKAUER,SMOOTH HANDLE,HIGH CAPACITY: Brand: MEDLINE INDUSTRIES, INC.

## (undated) DEVICE — MEDI-VAC NON-CONDUCTIVE SUCTION TUBING: Brand: CARDINAL HEALTH

## (undated) DEVICE — FORCEPS BX L240CM JAW DIA2.4MM ORNG L CAP W/ NDL DISP RAD

## (undated) DEVICE — UNDERPAD INCONT W23XL36IN STD BLU POLYPR BK FLUF SFT

## (undated) DEVICE — SYRINGE MED 50ML LUERSLIP TIP

## (undated) DEVICE — CANNULA NSL AD TBNG L14FT STD PVC O2 CRV CONN NONFLARED NSL

## (undated) DEVICE — LINER SUCT CANSTR 3000CC PLAS SFT PRE ASSEMB W/OUT TBNG W/

## (undated) DEVICE — GAUZE,SPONGE,4"X4",16PLY,STRL,LF,10/TRAY: Brand: MEDLINE

## (undated) DEVICE — BASIN EMSIS 16OZ GRAPHITE PLAS KID SHP MOLD GRAD FOR ORAL

## (undated) DEVICE — CATHETER SUCT TR FL TIP 14FR W/ O CTRL

## (undated) DEVICE — SOLUTION IRRIG 1000ML H2O STRL BLT

## (undated) DEVICE — SYRINGE MED 25GA 3ML L5/8IN SUBQ PLAS W/ DETACH NDL SFTY